# Patient Record
Sex: FEMALE | HISPANIC OR LATINO | Employment: FULL TIME | URBAN - METROPOLITAN AREA
[De-identification: names, ages, dates, MRNs, and addresses within clinical notes are randomized per-mention and may not be internally consistent; named-entity substitution may affect disease eponyms.]

---

## 2018-07-20 ENCOUNTER — HOSPITAL ENCOUNTER (EMERGENCY)
Facility: MEDICAL CENTER | Age: 33
End: 2018-07-20
Attending: EMERGENCY MEDICINE
Payer: MEDICAID

## 2018-07-20 VITALS
DIASTOLIC BLOOD PRESSURE: 58 MMHG | OXYGEN SATURATION: 100 % | RESPIRATION RATE: 14 BRPM | TEMPERATURE: 97.7 F | WEIGHT: 138.23 LBS | SYSTOLIC BLOOD PRESSURE: 112 MMHG | HEART RATE: 60 BPM | BODY MASS INDEX: 29.82 KG/M2 | HEIGHT: 57 IN

## 2018-07-20 DIAGNOSIS — R10.84 GENERALIZED ABDOMINAL PAIN: ICD-10-CM

## 2018-07-20 DIAGNOSIS — R11.2 NAUSEA AND VOMITING, INTRACTABILITY OF VOMITING NOT SPECIFIED, UNSPECIFIED VOMITING TYPE: ICD-10-CM

## 2018-07-20 LAB
ALBUMIN SERPL BCP-MCNC: 5.2 G/DL (ref 3.2–4.9)
ALBUMIN/GLOB SERPL: 1.4 G/DL
ALP SERPL-CCNC: 61 U/L (ref 30–99)
ALT SERPL-CCNC: 14 U/L (ref 2–50)
ANION GAP SERPL CALC-SCNC: 7 MMOL/L (ref 0–11.9)
APPEARANCE UR: CLEAR
AST SERPL-CCNC: 19 U/L (ref 12–45)
BASOPHILS # BLD AUTO: 0.5 % (ref 0–1.8)
BASOPHILS # BLD: 0.05 K/UL (ref 0–0.12)
BILIRUB SERPL-MCNC: 0.5 MG/DL (ref 0.1–1.5)
BUN SERPL-MCNC: 6 MG/DL (ref 8–22)
CALCIUM SERPL-MCNC: 10 MG/DL (ref 8.5–10.5)
CHLORIDE SERPL-SCNC: 105 MMOL/L (ref 96–112)
CO2 SERPL-SCNC: 27 MMOL/L (ref 20–33)
COLOR UR AUTO: YELLOW
CREAT SERPL-MCNC: 0.71 MG/DL (ref 0.5–1.4)
EOSINOPHIL # BLD AUTO: 0.3 K/UL (ref 0–0.51)
EOSINOPHIL NFR BLD: 2.8 % (ref 0–6.9)
ERYTHROCYTE [DISTWIDTH] IN BLOOD BY AUTOMATED COUNT: 41.2 FL (ref 35.9–50)
GLOBULIN SER CALC-MCNC: 3.6 G/DL (ref 1.9–3.5)
GLUCOSE SERPL-MCNC: 85 MG/DL (ref 65–99)
GLUCOSE UR QL STRIP.AUTO: NEGATIVE MG/DL
HCG UR QL: NEGATIVE
HCT VFR BLD AUTO: 46.4 % (ref 37–47)
HGB BLD-MCNC: 16.1 G/DL (ref 12–16)
IMM GRANULOCYTES # BLD AUTO: 0.02 K/UL (ref 0–0.11)
IMM GRANULOCYTES NFR BLD AUTO: 0.2 % (ref 0–0.9)
KETONES UR QL STRIP.AUTO: NEGATIVE MG/DL
LEUKOCYTE ESTERASE UR QL STRIP.AUTO: NEGATIVE
LIPASE SERPL-CCNC: 53 U/L (ref 11–82)
LYMPHOCYTES # BLD AUTO: 3.04 K/UL (ref 1–4.8)
LYMPHOCYTES NFR BLD: 27.9 % (ref 22–41)
MCH RBC QN AUTO: 30.3 PG (ref 27–33)
MCHC RBC AUTO-ENTMCNC: 34.7 G/DL (ref 33.6–35)
MCV RBC AUTO: 87.2 FL (ref 81.4–97.8)
MONOCYTES # BLD AUTO: 0.5 K/UL (ref 0–0.85)
MONOCYTES NFR BLD AUTO: 4.6 % (ref 0–13.4)
NEUTROPHILS # BLD AUTO: 6.98 K/UL (ref 2–7.15)
NEUTROPHILS NFR BLD: 64 % (ref 44–72)
NITRITE UR QL STRIP.AUTO: NEGATIVE
NRBC # BLD AUTO: 0 K/UL
NRBC BLD-RTO: 0 /100 WBC
PH UR STRIP.AUTO: 6.5 [PH]
PLATELET # BLD AUTO: 269 K/UL (ref 164–446)
PMV BLD AUTO: 10.4 FL (ref 9–12.9)
POTASSIUM SERPL-SCNC: 3.4 MMOL/L (ref 3.6–5.5)
PROT SERPL-MCNC: 8.8 G/DL (ref 6–8.2)
PROT UR QL STRIP: NEGATIVE MG/DL
RBC # BLD AUTO: 5.32 M/UL (ref 4.2–5.4)
RBC UR QL AUTO: NEGATIVE
SODIUM SERPL-SCNC: 139 MMOL/L (ref 135–145)
SP GR UR: <=1.005
WBC # BLD AUTO: 10.9 K/UL (ref 4.8–10.8)

## 2018-07-20 PROCEDURE — 700105 HCHG RX REV CODE 258: Performed by: EMERGENCY MEDICINE

## 2018-07-20 PROCEDURE — 96374 THER/PROPH/DIAG INJ IV PUSH: CPT

## 2018-07-20 PROCEDURE — 80053 COMPREHEN METABOLIC PANEL: CPT

## 2018-07-20 PROCEDURE — A9270 NON-COVERED ITEM OR SERVICE: HCPCS | Performed by: EMERGENCY MEDICINE

## 2018-07-20 PROCEDURE — 96361 HYDRATE IV INFUSION ADD-ON: CPT

## 2018-07-20 PROCEDURE — 85025 COMPLETE CBC W/AUTO DIFF WBC: CPT

## 2018-07-20 PROCEDURE — 99285 EMERGENCY DEPT VISIT HI MDM: CPT

## 2018-07-20 PROCEDURE — 36415 COLL VENOUS BLD VENIPUNCTURE: CPT

## 2018-07-20 PROCEDURE — 700102 HCHG RX REV CODE 250 W/ 637 OVERRIDE(OP): Performed by: EMERGENCY MEDICINE

## 2018-07-20 PROCEDURE — 81002 URINALYSIS NONAUTO W/O SCOPE: CPT

## 2018-07-20 PROCEDURE — 83690 ASSAY OF LIPASE: CPT

## 2018-07-20 PROCEDURE — 96375 TX/PRO/DX INJ NEW DRUG ADDON: CPT

## 2018-07-20 PROCEDURE — 700111 HCHG RX REV CODE 636 W/ 250 OVERRIDE (IP): Performed by: EMERGENCY MEDICINE

## 2018-07-20 PROCEDURE — 81025 URINE PREGNANCY TEST: CPT

## 2018-07-20 RX ORDER — DICYCLOMINE HCL 20 MG
20 TABLET ORAL 4 TIMES DAILY PRN
Qty: 20 TAB | Refills: 0 | Status: SHIPPED | OUTPATIENT
Start: 2018-07-20 | End: 2018-08-25

## 2018-07-20 RX ORDER — FAMOTIDINE 40 MG/1
40 TABLET, FILM COATED ORAL 2 TIMES DAILY
Qty: 14 TAB | Refills: 0 | Status: SHIPPED | OUTPATIENT
Start: 2018-07-20 | End: 2018-07-27

## 2018-07-20 RX ORDER — METOCLOPRAMIDE HYDROCHLORIDE 5 MG/ML
10 INJECTION INTRAMUSCULAR; INTRAVENOUS ONCE
Status: COMPLETED | OUTPATIENT
Start: 2018-07-20 | End: 2018-07-20

## 2018-07-20 RX ORDER — METOCLOPRAMIDE 10 MG/1
10 TABLET ORAL 4 TIMES DAILY PRN
Qty: 20 TAB | Refills: 0 | Status: SHIPPED | OUTPATIENT
Start: 2018-07-20 | End: 2018-08-25

## 2018-07-20 RX ORDER — SODIUM CHLORIDE 9 MG/ML
1000 INJECTION, SOLUTION INTRAVENOUS ONCE
Status: COMPLETED | OUTPATIENT
Start: 2018-07-20 | End: 2018-07-20

## 2018-07-20 RX ORDER — DIPHENHYDRAMINE HYDROCHLORIDE 50 MG/ML
12.5 INJECTION INTRAMUSCULAR; INTRAVENOUS ONCE
Status: COMPLETED | OUTPATIENT
Start: 2018-07-20 | End: 2018-07-20

## 2018-07-20 RX ADMIN — DIPHENHYDRAMINE HYDROCHLORIDE 12.5 MG: 50 INJECTION, SOLUTION INTRAMUSCULAR; INTRAVENOUS at 09:18

## 2018-07-20 RX ADMIN — LIDOCAINE HYDROCHLORIDE 30 ML: 20 SOLUTION OROPHARYNGEAL at 09:15

## 2018-07-20 RX ADMIN — METOCLOPRAMIDE 10 MG: 5 INJECTION, SOLUTION INTRAMUSCULAR; INTRAVENOUS at 09:19

## 2018-07-20 RX ADMIN — SODIUM CHLORIDE 1000 ML: 9 INJECTION, SOLUTION INTRAVENOUS at 09:19

## 2018-07-20 ASSESSMENT — ENCOUNTER SYMPTOMS
NAUSEA: 1
FEVER: 0
ABDOMINAL PAIN: 1
ROS GI COMMENTS: NEGATIVE HEMATEMESIS.
DIARRHEA: 1
BLOOD IN STOOL: 0
VOMITING: 1

## 2018-07-20 ASSESSMENT — PAIN SCALES - GENERAL: PAINLEVEL_OUTOF10: 8

## 2018-07-20 ASSESSMENT — LIFESTYLE VARIABLES: DO YOU DRINK ALCOHOL: NO

## 2018-07-20 NOTE — ED PROVIDER NOTES
ED Provider Note    Scribed for Elie Coppola M.D. by Brad Wu. 7/20/2018, 8:04 AM.    Primary care provider: Pcp Pt States None  Means of arrival: walk in  History obtained from: patient  History limited by: none    CHIEF COMPLAINT  Chief Complaint   Patient presents with   • Nausea/Vomiting/Diarrhea     Pt. states N/V/D for the past 3 days and this occurs every time she eats. Pt. states centralized abdominal pain. Pt. states the pain is sharp and stabbing in nature.       HPI  Christiane Jones is a 32 y.o. female who presents to the Emergency Department complaining of persistent and constant nausea, vomiting, and diarrhea for the last 3 days. She states that her nausea, vomiting is exacerbated with eating. Patient reports associated sharp abdominal pain. She reports a history of cholecystectomy. Patient denies fever, hematemesis, black stool, bloody stool, vaginal bleeding.    REVIEW OF SYSTEMS  Review of Systems   Constitutional: Negative for fever.   Gastrointestinal: Positive for abdominal pain, diarrhea, nausea and vomiting. Negative for blood in stool and melena.        Negative hematemesis.    Genitourinary:        Negative vaginal bleeding.    All other systems reviewed and are negative.  C.    PAST MEDICAL HISTORY   None noted    SURGICAL HISTORY   has a past surgical history that includes jorge by laparoscopy (9/14/2013).    SOCIAL HISTORY  Social History   Substance Use Topics   • Smoking status: Never Smoker   • Smokeless tobacco: Never Used   • Alcohol use No      Comment: special occations / on weekends       History   Drug Use   • Types: Inhaled     Comment: marijuana occ; last use 10/3/16       FAMILY HISTORY  Family History   Problem Relation Age of Onset   • Diabetes Father      pills   • Arthritis Mother    • Diabetes Mother      pills   • Arthritis Maternal Aunt    • Cancer Maternal Grandmother    • Diabetes Maternal Grandfather    • Diabetes Maternal Aunt        CURRENT  "MEDICATIONS  Current Outpatient Prescriptions:   •  hydrocodone-acetaminophen (NORCO) 5-325 MG Tab per tablet, Take 1-2 Tabs by mouth every 6 hours as needed., Disp: 20 Tab, Rfl: 0  •  ondansetron (ZOFRAN ODT) 4 MG TABLET DISPERSIBLE, Take 1 Tab by mouth every 8 hours as needed., Disp: 20 Tab, Rfl: 0    ALLERGIES  Allergies   Allergen Reactions   • Nkda [No Known Drug Allergy]        PHYSICAL EXAM  VITAL SIGNS: /58   Pulse 60   Temp 36.5 °C (97.7 °F)   Resp 14   Ht 1.448 m (4' 9\")   Wt 62.7 kg (138 lb 3.7 oz)   SpO2 100%   BMI 29.91 kg/m²     Constitutional:  Mild distress  HENT: Dry mucous membranes  Eyes:  No conjunctivitis or icterus  Neck: trachea is midline, no palpable thyroid  Lymphatic:  No cervical lymphadenopathy  Cardiovascular:  Regular rate and rhythm, no murmurs  Thorax & Lungs:  Normal breath sounds, no rhonchi  Abdomen:  Soft, left upper quadrant tenderness.   Skin:.  no rash  Back:  Non-tender, no CVA tenderness  Extremities:   no edema  Vascular:  symmetric radial pulse  Neurologic:  Normal gross motor    LABS  Labs Reviewed   CBC WITH DIFFERENTIAL - Abnormal; Notable for the following:        Result Value    WBC 10.9 (*)     Hemoglobin 16.1 (*)     All other components within normal limits   COMP METABOLIC PANEL - Abnormal; Notable for the following:     Potassium 3.4 (*)     Bun 6 (*)     Albumin 5.2 (*)     Total Protein 8.8 (*)     Globulin 3.6 (*)     All other components within normal limits   POC UA - Abnormal; Notable for the following:     POC Specific Gravity <=1.005 (*)     All other components within normal limits   LIPASE   ESTIMATED GFR   POC URINE PREGNANCY   All labs reviewed by me.    COURSE & MEDICAL DECISION MAKING  Pertinent Labs & Imaging studies reviewed. (See chart for details)    8:49 AM - Patient seen and examined at bedside. Patient will be treated with NS 1000 mL for clinical dehydration, Reglan 10 mg, GI coctail 30 ml, Benadryl 12.5 mg. Ordered CBC with " differential, CMP, Lipase, POC urinalysis, POC urine pregnancy, POC UA to evaluate her symptoms.    9:52 AM - Recheck: Patient re-evaluated at beside. Patient reports feeling greatly improved. Patient had a positive response to IV fluids. Patient's lab results discussed. Discussed patient's condition and treatment plan. Patient will be discharged with instructions and provided with strict return precautions. Advised to follow up with her primary. Instructed to return to Emergency Department immediately if any new or worsening symptoms.    The patient will return for new or worsening symptoms and is stable at the time of discharge.    The patient is referred to a primary physician for blood pressure management, diabetic screening, and for all other preventative health concerns.    DISPOSITION:  Patient will be discharged home in stable condition.    FOLLOW UP:  [address] [address] [phone]          OUTPATIENT MEDICATIONS:  Discharge Medication List as of 7/20/2018 10:05 AM      START taking these medications    Details   dicyclomine (BENTYL) 20 MG Tab Take 1 Tab by mouth 4 times a day as needed (abdominal pain)., Disp-20 Tab, R-0, Print Rx Paper      famotidine (PEPCID) 40 MG Tab Take 1 Tab by mouth 2 times a day for 7 days., Disp-14 Tab, R-0, Print Rx Paper      metoclopramide (REGLAN) 10 MG Tab Take 1 Tab by mouth 4 times a day as needed (prn nausea)., Disp-20 Tab, R-0, Print Rx Paper               FINAL IMPRESSION  1. Nausea and vomiting, intractability of vomiting not specified, unspecified vomiting type    2. Generalized abdominal pain          Brad IRENE (Alise), am scribing for, and in the presence of, Elie Coppola M.D..    Electronically signed by: Brad Wu (Alise), 7/20/2018    Elie IRENE M.D. personally performed the services described in this documentation, as scribed by Brad Wu in my presence, and it  is both accurate and complete.    The note accurately reflects work and decisions made by me.  Elie Coppola  7/20/2018  2:19 PM

## 2018-07-20 NOTE — ED TRIAGE NOTES
"Christiane Jones  32 y.o. female  Chief Complaint   Patient presents with   • Nausea/Vomiting/Diarrhea     Pt. states N/V/D for the past 3 days and this occurs every time she eats. Pt. states centralized abdominal pain. Pt. states the pain is sharp and stabbing in nature.     /58   Pulse 60   Temp 36.5 °C (97.7 °F)   Resp 14   Ht 1.448 m (4' 9\")   Wt 62.7 kg (138 lb 3.7 oz)   SpO2 100%   BMI 29.91 kg/m²     Pt amb to triage with steady gait for above complaint. Pt. Given emesis bag and blanket for comfort.  Pt is alert and oriented, speaking in full sentences, follows commands and responds appropriately to questions. NAD. Resp are even and unlabored.  Pt placed in lobby. Pt educated on triage process. Pt encouraged to alert staff for any changes.  "

## 2018-07-20 NOTE — ED NOTES
Pt given discharge instructions/prescriptions given/ home care instructions explained, pt verbalized understanding of instructions given, pt ambulatory to [facility] orantes.

## 2018-07-20 NOTE — DISCHARGE INSTRUCTIONS
Abdominal Pain, Women  Abdominal (stomach, pelvic, or belly) pain can be caused by many things. It is important to tell your doctor:  · The location of the pain.  · Does it come and go or is it present all the time?  · Are there things that start the pain (eating certain foods, exercise)?  · Are there other symptoms associated with the pain (fever, nausea, vomiting, diarrhea)?  All of this is helpful to know when trying to find the cause of the pain.  CAUSES   · Stomach: virus or bacteria infection, or ulcer.  · Intestine: appendicitis (inflamed appendix), regional ileitis (Crohn's disease), ulcerative colitis (inflamed colon), irritable bowel syndrome, diverticulitis (inflamed diverticulum of the colon), or cancer of the stomach or intestine.  · Gallbladder disease or stones in the gallbladder.  · Kidney disease, kidney stones, or infection.  · Pancreas infection or cancer.  · Fibromyalgia (pain disorder).  · Diseases of the female organs:  · Uterus: fibroid (non-cancerous) tumors or infection.  · Fallopian tubes: infection or tubal pregnancy.  · Ovary: cysts or tumors.  · Pelvic adhesions (scar tissue).  · Endometriosis (uterus lining tissue growing in the pelvis and on the pelvic organs).  · Pelvic congestion syndrome (female organs filling up with blood just before the menstrual period).  · Pain with the menstrual period.  · Pain with ovulation (producing an egg).  · Pain with an IUD (intrauterine device, birth control) in the uterus.  · Cancer of the female organs.  · Functional pain (pain not caused by a disease, may improve without treatment).  · Psychological pain.  · Depression.  DIAGNOSIS   Your doctor will decide the seriousness of your pain by doing an examination.  · Blood tests.  · X-rays.  · Ultrasound.  · CT scan (computed tomography, special type of X-ray).  · MRI (magnetic resonance imaging).  · Cultures, for infection.  · Barium enema (dye inserted in the large intestine, to better view it with  X-rays).  · Colonoscopy (looking in intestine with a lighted tube).  · Laparoscopy (minor surgery, looking in abdomen with a lighted tube).  · Major abdominal exploratory surgery (looking in abdomen with a large incision).  TREATMENT   The treatment will depend on the cause of the pain.   · Many cases can be observed and treated at home.  · Over-the-counter medicines recommended by your caregiver.  · Prescription medicine.  · Antibiotics, for infection.  · Birth control pills, for painful periods or for ovulation pain.  · Hormone treatment, for endometriosis.  · Nerve blocking injections.  · Physical therapy.  · Antidepressants.  · Counseling with a psychologist or psychiatrist.  · Minor or major surgery.  HOME CARE INSTRUCTIONS   · Do not take laxatives, unless directed by your caregiver.  · Take over-the-counter pain medicine only if ordered by your caregiver. Do not take aspirin because it can cause an upset stomach or bleeding.  · Try a clear liquid diet (broth or water) as ordered by your caregiver. Slowly move to a bland diet, as tolerated, if the pain is related to the stomach or intestine.  · Have a thermometer and take your temperature several times a day, and record it.  · Bed rest and sleep, if it helps the pain.  · Avoid sexual intercourse, if it causes pain.  · Avoid stressful situations.  · Keep your follow-up appointments and tests, as your caregiver orders.  · If the pain does not go away with medicine or surgery, you may try:  · Acupuncture.  · Relaxation exercises (yoga, meditation).  · Group therapy.  · Counseling.  SEEK MEDICAL CARE IF:   · You notice certain foods cause stomach pain.  · Your home care treatment is not helping your pain.  · You need stronger pain medicine.  · You want your IUD removed.  · You feel faint or lightheaded.  · You develop nausea and vomiting.  · You develop a rash.  · You are having side effects or an allergy to your medicine.  SEEK IMMEDIATE MEDICAL CARE IF:   · Your  pain does not go away or gets worse.  · You have a fever.  · Your pain is felt only in portions of the abdomen. The right side could possibly be appendicitis. The left lower portion of the abdomen could be colitis or diverticulitis.  · You are passing blood in your stools (bright red or black tarry stools, with or without vomiting).  · You have blood in your urine.  · You develop chills, with or without a fever.  · You pass out.  MAKE SURE YOU:   · Understand these instructions.  · Will watch your condition.  · Will get help right away if you are not doing well or get worse.  Document Released: 10/14/2008 Document Revised: 03/11/2013 Document Reviewed: 11/04/2010  [employer]® Patient Information ©2014 [employer]

## 2018-08-25 ENCOUNTER — APPOINTMENT (OUTPATIENT)
Dept: RADIOLOGY | Facility: MEDICAL CENTER | Age: 33
End: 2018-08-25
Attending: EMERGENCY MEDICINE
Payer: MEDICAID

## 2018-08-25 ENCOUNTER — HOSPITAL ENCOUNTER (EMERGENCY)
Facility: MEDICAL CENTER | Age: 33
End: 2018-08-26
Attending: EMERGENCY MEDICINE
Payer: MEDICAID

## 2018-08-25 DIAGNOSIS — R10.31 RIGHT LOWER QUADRANT ABDOMINAL PAIN: ICD-10-CM

## 2018-08-25 DIAGNOSIS — N83.209 RUPTURED OVARIAN CYST: ICD-10-CM

## 2018-08-25 LAB
ALBUMIN SERPL BCP-MCNC: 4.6 G/DL (ref 3.2–4.9)
ALBUMIN/GLOB SERPL: 1.4 G/DL
ALP SERPL-CCNC: 54 U/L (ref 30–99)
ALT SERPL-CCNC: 13 U/L (ref 2–50)
ANION GAP SERPL CALC-SCNC: 11 MMOL/L (ref 0–11.9)
APPEARANCE UR: ABNORMAL
AST SERPL-CCNC: 17 U/L (ref 12–45)
BACTERIA #/AREA URNS HPF: ABNORMAL /HPF
BASOPHILS # BLD AUTO: 0.2 % (ref 0–1.8)
BASOPHILS # BLD: 0.03 K/UL (ref 0–0.12)
BILIRUB SERPL-MCNC: 0.5 MG/DL (ref 0.1–1.5)
BILIRUB UR QL STRIP.AUTO: NEGATIVE
BUN SERPL-MCNC: 10 MG/DL (ref 8–22)
CALCIUM SERPL-MCNC: 9.7 MG/DL (ref 8.5–10.5)
CHLORIDE SERPL-SCNC: 103 MMOL/L (ref 96–112)
CO2 SERPL-SCNC: 23 MMOL/L (ref 20–33)
COLOR UR: YELLOW
CREAT SERPL-MCNC: 0.66 MG/DL (ref 0.5–1.4)
EOSINOPHIL # BLD AUTO: 0.18 K/UL (ref 0–0.51)
EOSINOPHIL NFR BLD: 1.2 % (ref 0–6.9)
EPI CELLS #/AREA URNS HPF: ABNORMAL /HPF
ERYTHROCYTE [DISTWIDTH] IN BLOOD BY AUTOMATED COUNT: 39.8 FL (ref 35.9–50)
GLOBULIN SER CALC-MCNC: 3.3 G/DL (ref 1.9–3.5)
GLUCOSE SERPL-MCNC: 96 MG/DL (ref 65–99)
GLUCOSE UR STRIP.AUTO-MCNC: NEGATIVE MG/DL
HCG UR QL: NEGATIVE
HCT VFR BLD AUTO: 42.1 % (ref 37–47)
HGB BLD-MCNC: 14.9 G/DL (ref 12–16)
HYALINE CASTS #/AREA URNS LPF: ABNORMAL /LPF
IMM GRANULOCYTES # BLD AUTO: 0.08 K/UL (ref 0–0.11)
IMM GRANULOCYTES NFR BLD AUTO: 0.5 % (ref 0–0.9)
KETONES UR STRIP.AUTO-MCNC: NEGATIVE MG/DL
LEUKOCYTE ESTERASE UR QL STRIP.AUTO: ABNORMAL
LYMPHOCYTES # BLD AUTO: 2.39 K/UL (ref 1–4.8)
LYMPHOCYTES NFR BLD: 15.8 % (ref 22–41)
MCH RBC QN AUTO: 30.6 PG (ref 27–33)
MCHC RBC AUTO-ENTMCNC: 35.4 G/DL (ref 33.6–35)
MCV RBC AUTO: 86.4 FL (ref 81.4–97.8)
MICRO URNS: ABNORMAL
MONOCYTES # BLD AUTO: 0.73 K/UL (ref 0–0.85)
MONOCYTES NFR BLD AUTO: 4.8 % (ref 0–13.4)
MUCOUS THREADS #/AREA URNS HPF: ABNORMAL /HPF
NEUTROPHILS # BLD AUTO: 11.7 K/UL (ref 2–7.15)
NEUTROPHILS NFR BLD: 77.5 % (ref 44–72)
NITRITE UR QL STRIP.AUTO: NEGATIVE
NRBC # BLD AUTO: 0 K/UL
NRBC BLD-RTO: 0 /100 WBC
PH UR STRIP.AUTO: 5 [PH]
PLATELET # BLD AUTO: 249 K/UL (ref 164–446)
PMV BLD AUTO: 10.3 FL (ref 9–12.9)
POTASSIUM SERPL-SCNC: 3.6 MMOL/L (ref 3.6–5.5)
PROT SERPL-MCNC: 7.9 G/DL (ref 6–8.2)
PROT UR QL STRIP: NEGATIVE MG/DL
RBC # BLD AUTO: 4.87 M/UL (ref 4.2–5.4)
RBC # URNS HPF: ABNORMAL /HPF
RBC UR QL AUTO: NEGATIVE
SODIUM SERPL-SCNC: 137 MMOL/L (ref 135–145)
SP GR UR REFRACTOMETRY: 1.03
SP GR UR STRIP.AUTO: 1.03
UROBILINOGEN UR STRIP.AUTO-MCNC: 0.2 MG/DL
WBC # BLD AUTO: 15.1 K/UL (ref 4.8–10.8)
WBC #/AREA URNS HPF: ABNORMAL /HPF

## 2018-08-25 PROCEDURE — 700102 HCHG RX REV CODE 250 W/ 637 OVERRIDE(OP): Performed by: EMERGENCY MEDICINE

## 2018-08-25 PROCEDURE — A9270 NON-COVERED ITEM OR SERVICE: HCPCS | Performed by: EMERGENCY MEDICINE

## 2018-08-25 PROCEDURE — 81001 URINALYSIS AUTO W/SCOPE: CPT

## 2018-08-25 PROCEDURE — 74177 CT ABD & PELVIS W/CONTRAST: CPT

## 2018-08-25 PROCEDURE — 87086 URINE CULTURE/COLONY COUNT: CPT

## 2018-08-25 PROCEDURE — 96374 THER/PROPH/DIAG INJ IV PUSH: CPT

## 2018-08-25 PROCEDURE — 76830 TRANSVAGINAL US NON-OB: CPT

## 2018-08-25 PROCEDURE — 99285 EMERGENCY DEPT VISIT HI MDM: CPT

## 2018-08-25 PROCEDURE — 81025 URINE PREGNANCY TEST: CPT

## 2018-08-25 PROCEDURE — 87491 CHLMYD TRACH DNA AMP PROBE: CPT

## 2018-08-25 PROCEDURE — 85025 COMPLETE CBC W/AUTO DIFF WBC: CPT

## 2018-08-25 PROCEDURE — 80053 COMPREHEN METABOLIC PANEL: CPT

## 2018-08-25 PROCEDURE — 700117 HCHG RX CONTRAST REV CODE 255: Performed by: EMERGENCY MEDICINE

## 2018-08-25 PROCEDURE — 96375 TX/PRO/DX INJ NEW DRUG ADDON: CPT

## 2018-08-25 PROCEDURE — 87591 N.GONORRHOEAE DNA AMP PROB: CPT

## 2018-08-25 PROCEDURE — 700111 HCHG RX REV CODE 636 W/ 250 OVERRIDE (IP): Performed by: EMERGENCY MEDICINE

## 2018-08-25 RX ORDER — ONDANSETRON 2 MG/ML
4 INJECTION INTRAMUSCULAR; INTRAVENOUS ONCE
Status: COMPLETED | OUTPATIENT
Start: 2018-08-25 | End: 2018-08-25

## 2018-08-25 RX ORDER — MORPHINE SULFATE 4 MG/ML
4 INJECTION, SOLUTION INTRAMUSCULAR; INTRAVENOUS ONCE
Status: COMPLETED | OUTPATIENT
Start: 2018-08-25 | End: 2018-08-25

## 2018-08-25 RX ADMIN — MORPHINE SULFATE 4 MG: 4 INJECTION INTRAVENOUS at 21:59

## 2018-08-25 RX ADMIN — IOHEXOL 100 ML: 350 INJECTION, SOLUTION INTRAVENOUS at 23:49

## 2018-08-25 RX ADMIN — ONDANSETRON 4 MG: 2 INJECTION INTRAMUSCULAR; INTRAVENOUS at 21:58

## 2018-08-25 RX ADMIN — LIDOCAINE HYDROCHLORIDE 15 ML: 20 SOLUTION OROPHARYNGEAL at 23:15

## 2018-08-25 ASSESSMENT — ENCOUNTER SYMPTOMS
DIARRHEA: 1
ABDOMINAL PAIN: 1
NAUSEA: 1
VOMITING: 0

## 2018-08-25 ASSESSMENT — PAIN SCALES - WONG BAKER: WONGBAKER_NUMERICALRESPONSE: HURTS A WHOLE LOT

## 2018-08-25 ASSESSMENT — PAIN SCALES - GENERAL
PAINLEVEL_OUTOF10: 9
PAINLEVEL_OUTOF10: 9
PAINLEVEL_OUTOF10: 8

## 2018-08-26 VITALS
WEIGHT: 134.04 LBS | DIASTOLIC BLOOD PRESSURE: 50 MMHG | BODY MASS INDEX: 28.92 KG/M2 | HEIGHT: 57 IN | OXYGEN SATURATION: 96 % | RESPIRATION RATE: 16 BRPM | TEMPERATURE: 98.3 F | HEART RATE: 70 BPM | SYSTOLIC BLOOD PRESSURE: 90 MMHG

## 2018-08-26 LAB
C TRACH DNA SPEC QL NAA+PROBE: NEGATIVE
N GONORRHOEA DNA SPEC QL NAA+PROBE: NEGATIVE
SPECIMEN SOURCE: NORMAL

## 2018-08-26 RX ORDER — KETOROLAC TROMETHAMINE 10 MG/1
10 TABLET, FILM COATED ORAL EVERY 4 HOURS PRN
Qty: 30 TAB | Refills: 0 | Status: SHIPPED | OUTPATIENT
Start: 2018-08-26 | End: 2018-09-05

## 2018-08-26 ASSESSMENT — PAIN SCALES - GENERAL: PAINLEVEL_OUTOF10: 3

## 2018-08-26 NOTE — DISCHARGE INSTRUCTIONS
Your CT and ultrasound revealed a ruptured ovarian cyst, this is likely the cause of your pain.  Your ultrasound should be followed up with a gynecologist or your primary care physician in the next 6 weeks.    Ovarian Cyst  An ovarian cyst is a fluid-filled sac on an ovary. The ovaries are organs that make eggs in women. Most ovarian cysts go away on their own and are not cancerous (are benign). Some cysts need treatment.  Follow these instructions at home:  · Take over-the-counter and prescription medicines only as told by your doctor.  · Do not drive or use heavy machinery while taking prescription pain medicine.  · Get pelvic exams and Pap tests as often as told by your doctor.  · Return to your normal activities as told by your doctor. Ask your doctor what activities are safe for you.  · Do not use any products that contain nicotine or tobacco, such as cigarettes and e-cigarettes. If you need help quitting, ask your doctor.  · Keep all follow-up visits as told by your doctor. This is important.  Contact a doctor if:  · Your periods are:  ¨ Late.  ¨ Irregular.  ¨ Painful.  · Your periods stop.  · You have pelvic pain that does not go away.  · You have pressure on your bladder.  · You have trouble making your bladder empty when you pee (urinate).  · You have pain during sex.  · You have any of the following in your belly (abdomen):  ¨ A feeling of fullness.  ¨ Pressure.  ¨ Discomfort.  ¨ Pain that does not go away.  ¨ Swelling.  · You feel sick most of the time.  · You have trouble pooping (have constipation).  · You are not as hungry as usual (you lose your appetite).  · You get very bad acne.  · You start to have more hair on your body and face.  · You are gaining weight or losing weight without changing your exercise and eating habits.  · You think you may be pregnant.  Get help right away if:  · You have belly pain that is very bad or gets worse.  · You cannot eat or drink without throwing up  (vomiting).  · You suddenly get a fever.  · Your period is a lot heavier than usual.  This information is not intended to replace advice given to you by your health care provider. Make sure you discuss any questions you have with your health care provider.  Document Released: 06/05/2009 Document Revised: 07/07/2017 Document Reviewed: 05/21/2017  [employer] Patient Education © 2017 [employer]

## 2018-08-26 NOTE — ED PROVIDER NOTES
"ED Provider Note    Scribed for Guillermo Araujo M.D. by Tacos Greene. 8/25/2018  9:08 PM    Means of arrival: Walk in  History obtained by: Patient  Limitations: None      CHIEF COMPLAINT  Chief Complaint   Patient presents with   • RLQ Pain   • Nausea/Vomiting/Diarrhea       HPI  Christiane Jones is a 32 y.o. female who presents to the ED complaining of abdominal pain which initially started approximately 1 week ago. Patient states the pain feels like menstrual cramps. She also experiences intermittent sharp pain \"like contractions.\" The pain was initially more diffuse to her abdomen but became more prominent to her right lower quadrant starting today. She has a history of cholecystectomy and states her current pain feels similar to prior gallbladder pain. She still has her appendix. Patient notes having diarrhea in which she has to go to the bathroom within a couple of minutes after eating. She also reports nausea. She has been unable to eating anything today secondary to the nausea and diarrhea. Patient denies any vomiting. No vaginal symptoms. She was tested for STD 4 days ago but has not received a call with results yet.  She denies any vaginal discharge dysuria or hematuria      REVIEW OF SYSTEMS  Review of Systems   Gastrointestinal: Positive for abdominal pain (prominent to RLQ), diarrhea and nausea. Negative for vomiting.   Genitourinary:        Negative vaginal symptoms   All other systems reviewed and are negative.  See HPI for further details.   C    PAST MEDICAL HISTORY   None noted    SOCIAL HISTORY  Social History     Social History Main Topics   • Smoking status: Never Smoker   • Smokeless tobacco: Never Used   • Alcohol use Yes      Comment: special occations / on weekends    • Drug use: Yes     Types: Inhaled      Comment: marijuana occ   • Sexual activity: Yes     Partners: Male     Birth control/ protection: Condom       SURGICAL HISTORY   has a past surgical history that includes jorge by " "laparoscopy (9/14/2013).    CURRENT MEDICATIONS  Home Medications     Reviewed by No Boss R.N. (Registered Nurse) on 08/25/18 at 2104  Med List Status: Complete   Medication Last Dose Status        Patient Rickie Taking any Medications                       ALLERGIES  Allergies   Allergen Reactions   • Nkda [No Known Drug Allergy]        PHYSICAL EXAM  BP (!) 90/50   Pulse 60   Temp 36.8 °C (98.3 °F)   Resp 16   Ht 1.448 m (4' 9\")   Wt 60.8 kg (134 lb 0.6 oz)   LMP 08/08/2018   SpO2 99%   Breastfeeding? No   BMI 29.01 kg/m²   Constitutional: Well developed, Well nourished, No acute distress, Non-toxic appearance.   HENT: Normocephalic, Atraumatic,  Eyes: PERRL, EOM intact  Neck: Supple, no meningismus  Lymphatic: No lymphadenopathy noted.   Cardiovascular: Regular rate and rhythm  Lungs: Clear to auscultation bilaterally, easy unlabored respirations   Abdomen: Bowel sounds normal, Soft. Focal tenderness at McBurney's point.  Skin: Warm, Dry, no rash  Back: No tenderness, No CVA tenderness.   Extremities: No edema to lower extremities  Neurologic: Alert and oriented, appropriate, follows commands, moving all extremities, normal speech   Psychiatric: Affect normal      DIAGNOSTIC STUDIES / PROCEDURES    LABS  Results for orders placed or performed during the hospital encounter of 08/25/18   BETA-HCG QUALITATIVE URINE   Result Value Ref Range    Beta-Hcg Urine Negative Negative   URINALYSIS,CULTURE IF INDICATED   Result Value Ref Range    Color Yellow     Character Cloudy (A)     Specific Gravity 1.026 <1.035    Ph 5.0 5.0 - 8.0    Glucose Negative Negative mg/dL    Ketones Negative Negative mg/dL    Protein Negative Negative mg/dL    Bilirubin Negative Negative    Urobilinogen, Urine 0.2 Negative    Nitrite Negative Negative    Leukocyte Esterase Small (A) Negative    Occult Blood Negative Negative    Micro Urine Req Microscopic    REFRACTOMETER SG   Result Value Ref Range    Specific Gravity 1.026 "    CBC WITH DIFFERENTIAL   Result Value Ref Range    WBC 15.1 (H) 4.8 - 10.8 K/uL    RBC 4.87 4.20 - 5.40 M/uL    Hemoglobin 14.9 12.0 - 16.0 g/dL    Hematocrit 42.1 37.0 - 47.0 %    MCV 86.4 81.4 - 97.8 fL    MCH 30.6 27.0 - 33.0 pg    MCHC 35.4 (H) 33.6 - 35.0 g/dL    RDW 39.8 35.9 - 50.0 fL    Platelet Count 249 164 - 446 K/uL    MPV 10.3 9.0 - 12.9 fL    Neutrophils-Polys 77.50 (H) 44.00 - 72.00 %    Lymphocytes 15.80 (L) 22.00 - 41.00 %    Monocytes 4.80 0.00 - 13.40 %    Eosinophils 1.20 0.00 - 6.90 %    Basophils 0.20 0.00 - 1.80 %    Immature Granulocytes 0.50 0.00 - 0.90 %    Nucleated RBC 0.00 /100 WBC    Neutrophils (Absolute) 11.70 (H) 2.00 - 7.15 K/uL    Lymphs (Absolute) 2.39 1.00 - 4.80 K/uL    Monos (Absolute) 0.73 0.00 - 0.85 K/uL    Eos (Absolute) 0.18 0.00 - 0.51 K/uL    Baso (Absolute) 0.03 0.00 - 0.12 K/uL    Immature Granulocytes (abs) 0.08 0.00 - 0.11 K/uL    NRBC (Absolute) 0.00 K/uL   CMP   Result Value Ref Range    Sodium 137 135 - 145 mmol/L    Potassium 3.6 3.6 - 5.5 mmol/L    Chloride 103 96 - 112 mmol/L    Co2 23 20 - 33 mmol/L    Anion Gap 11.0 0.0 - 11.9    Glucose 96 65 - 99 mg/dL    Bun 10 8 - 22 mg/dL    Creatinine 0.66 0.50 - 1.40 mg/dL    Calcium 9.7 8.5 - 10.5 mg/dL    AST(SGOT) 17 12 - 45 U/L    ALT(SGPT) 13 2 - 50 U/L    Alkaline Phosphatase 54 30 - 99 U/L    Total Bilirubin 0.5 0.1 - 1.5 mg/dL    Albumin 4.6 3.2 - 4.9 g/dL    Total Protein 7.9 6.0 - 8.2 g/dL    Globulin 3.3 1.9 - 3.5 g/dL    A-G Ratio 1.4 g/dL   URINE MICROSCOPIC (W/UA)   Result Value Ref Range    WBC 2-5 /hpf    RBC 0-2 /hpf    Bacteria Moderate (A) None /hpf    Epithelial Cells Few /hpf    Hyaline Cast 6-10 (A) /lpf    Mucous Threads Moderate /hpf   ESTIMATED GFR   Result Value Ref Range    GFR If African American >60 >60 mL/min/1.73 m 2    GFR If Non African American >60 >60 mL/min/1.73 m 2         RADIOLOGY  US-GYN-PELVIS TRANSVAGINAL    (Results Pending)     CT-ABDOMEN-PELVIS WITH   Final Result          1.  No acute abnormality.   2.  Peripherally enhancing right ovarian lesion, appearance most typical of involuting cyst. Could be followed up with pelvic sonography for further characterization.   3.  Hepatomegaly   4.  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  Nabothian cyst.   2.  Thickened endometrial stripe, likely proliferative given patient age, consider endometrial pathology. Could be followed up in 6 weeks with repeat pelvic sonogram.   3.  Lesion in the right ovary with irregular contour, appearance suggests involuting cyst. Otherwise indeterminate. Could be followed up with six-week repeat pelvic sonogram.              COURSE & MEDICAL DECISION MAKING  Pertinent Labs & Imaging studies reviewed. (See chart for details)    Summary of CARE  Patient here with symptoms consistent with the hemorrhagic cyst versus appendicitis versus ovarian torsion.  Patient will need evaluation with labs and imaging to rule these etiologies out.  Given the colicky nature of patient's pain I am leaning towards an ovarian etiology.  The ultrasound failed to reveal any signs of torsion but does show a questionable involuting cyst.  CT checked as this may be a incidental finding and given patient's focal tenderness at McBurney's point.  Patient CT fails to reveal signs of appendicitis and really demonstrates the involuting cyst.  This is likely the cause of etiology of patient's pain.  I discussed ultrasound and CT findings with patient and she understands to follow-up for repeat ultrasound and further evaluation with a gynecologist.    9:08 PM Patient seen and examined at bedside. The patient presents with right lower quadrant pain. Discussed plan of care which includes imaging and labs. Patient understands and agrees to plan. Ordered for US gyn pelvis transvaginal, CBC, CMP, POC urinalysis, POC urine pregnancy, chlamydia/GC PCR urine or swab, urinalysis,  beta-HCG qualitative urine, urine microscopic, refractometer SG, urine culture to evaluate.    Following analgesics patient reports pain is considerably improved    Patient updated on results    FINAL IMPRESSION  1.  Abdominal pain, ovarian cyst      Tacos IRENE (Scribe), am scribing for, and in the presence of, Guillermo Araujo M.D..    Electronically signed by: Tacos Greene (Scribe), 8/25/2018    IGuillermo M.D. personally performed the services described in this documentation, as scribed by Tacos Gerene in my presence, and it is both accurate and complete.    The note accurately reflects work and decisions made by me.  Guillermo Arajuo  8/26/2018  2:02 AM

## 2018-08-26 NOTE — ED NOTES
Pt in bed,  at bedside. Updated on plan of care. Verbalizes understanding. Denies any further needs or concerns at this time.

## 2018-08-26 NOTE — ED NOTES
Pt provided with discharge instructions and education. Verbalizes understanding. Denies any questions or concerns at this time. Ambulates independently to lobby.

## 2018-08-26 NOTE — ED TRIAGE NOTES
Pt states she has been having cramping abdominal pain since Saturday, pt states she was tested for std and hpv at urgent care. Pt states the pain is mainly RLQ with diarrhea, pt still has her appendix, pt has gallbladder removed. Pt states last menstrual cycle was 8/8/18-8/12-18, pt does feel nauseated, pt denies fevers but has been having night sweats.

## 2018-08-26 NOTE — ED NOTES
Pt has been having increasing abdominal pain for the past week. Describes the pain as contraction-like, starting in lower right abdomen and radiating through lower back, worse when she stands straight or has intercourse. Was seen at urgent care a few days ago for the same thing, no confirmed diagnosis noted. Pt experiences nausea and vomiting in conjunction with pain, having difficulty holding any food down at this time. Pt denies any further needs or concerns at this time.

## 2018-08-26 NOTE — ED NOTES
Updated on plan of care. Verbalizes understanding. Denies any further needs or concerns at this time.

## 2018-08-27 LAB
BACTERIA UR CULT: NORMAL
SIGNIFICANT IND 70042: NORMAL
SITE SITE: NORMAL
SOURCE SOURCE: NORMAL

## 2019-05-12 ENCOUNTER — HOSPITAL ENCOUNTER (EMERGENCY)
Facility: MEDICAL CENTER | Age: 34
End: 2019-05-13
Attending: EMERGENCY MEDICINE

## 2019-05-12 DIAGNOSIS — F41.9 ANXIETY: ICD-10-CM

## 2019-05-12 DIAGNOSIS — R07.9 CHEST PAIN, UNSPECIFIED TYPE: ICD-10-CM

## 2019-05-12 PROCEDURE — 99283 EMERGENCY DEPT VISIT LOW MDM: CPT

## 2019-05-13 ENCOUNTER — APPOINTMENT (OUTPATIENT)
Dept: RADIOLOGY | Facility: MEDICAL CENTER | Age: 34
End: 2019-05-13
Attending: EMERGENCY MEDICINE

## 2019-05-13 VITALS
OXYGEN SATURATION: 98 % | RESPIRATION RATE: 17 BRPM | DIASTOLIC BLOOD PRESSURE: 62 MMHG | TEMPERATURE: 97 F | HEART RATE: 58 BPM | SYSTOLIC BLOOD PRESSURE: 107 MMHG | BODY MASS INDEX: 27.67 KG/M2 | WEIGHT: 131.84 LBS | HEIGHT: 58 IN

## 2019-05-13 LAB
ALBUMIN SERPL BCP-MCNC: 4.3 G/DL (ref 3.2–4.9)
ALBUMIN/GLOB SERPL: 1.4 G/DL
ALP SERPL-CCNC: 57 U/L (ref 30–99)
ALT SERPL-CCNC: 10 U/L (ref 2–50)
ANION GAP SERPL CALC-SCNC: 7 MMOL/L (ref 0–11.9)
AST SERPL-CCNC: 16 U/L (ref 12–45)
BASOPHILS # BLD AUTO: 0.4 % (ref 0–1.8)
BASOPHILS # BLD: 0.04 K/UL (ref 0–0.12)
BILIRUB SERPL-MCNC: 0.5 MG/DL (ref 0.1–1.5)
BUN SERPL-MCNC: 10 MG/DL (ref 8–22)
CALCIUM SERPL-MCNC: 9.4 MG/DL (ref 8.5–10.5)
CHLORIDE SERPL-SCNC: 107 MMOL/L (ref 96–112)
CO2 SERPL-SCNC: 26 MMOL/L (ref 20–33)
CREAT SERPL-MCNC: 0.83 MG/DL (ref 0.5–1.4)
EKG IMPRESSION: NORMAL
EOSINOPHIL # BLD AUTO: 0.29 K/UL (ref 0–0.51)
EOSINOPHIL NFR BLD: 2.7 % (ref 0–6.9)
ERYTHROCYTE [DISTWIDTH] IN BLOOD BY AUTOMATED COUNT: 42 FL (ref 35.9–50)
GLOBULIN SER CALC-MCNC: 3 G/DL (ref 1.9–3.5)
GLUCOSE SERPL-MCNC: 93 MG/DL (ref 65–99)
HCT VFR BLD AUTO: 42.9 % (ref 37–47)
HGB BLD-MCNC: 14.4 G/DL (ref 12–16)
IMM GRANULOCYTES # BLD AUTO: 0.03 K/UL (ref 0–0.11)
IMM GRANULOCYTES NFR BLD AUTO: 0.3 % (ref 0–0.9)
LYMPHOCYTES # BLD AUTO: 3.58 K/UL (ref 1–4.8)
LYMPHOCYTES NFR BLD: 33 % (ref 22–41)
MCH RBC QN AUTO: 29.8 PG (ref 27–33)
MCHC RBC AUTO-ENTMCNC: 33.6 G/DL (ref 33.6–35)
MCV RBC AUTO: 88.6 FL (ref 81.4–97.8)
MONOCYTES # BLD AUTO: 0.7 K/UL (ref 0–0.85)
MONOCYTES NFR BLD AUTO: 6.4 % (ref 0–13.4)
NEUTROPHILS # BLD AUTO: 6.22 K/UL (ref 2–7.15)
NEUTROPHILS NFR BLD: 57.2 % (ref 44–72)
NRBC # BLD AUTO: 0 K/UL
NRBC BLD-RTO: 0 /100 WBC
PLATELET # BLD AUTO: 246 K/UL (ref 164–446)
PMV BLD AUTO: 10.6 FL (ref 9–12.9)
POTASSIUM SERPL-SCNC: 3.3 MMOL/L (ref 3.6–5.5)
PROT SERPL-MCNC: 7.3 G/DL (ref 6–8.2)
RBC # BLD AUTO: 4.84 M/UL (ref 4.2–5.4)
SODIUM SERPL-SCNC: 140 MMOL/L (ref 135–145)
TROPONIN I SERPL-MCNC: 0.01 NG/ML (ref 0–0.04)
WBC # BLD AUTO: 10.9 K/UL (ref 4.8–10.8)

## 2019-05-13 PROCEDURE — 93005 ELECTROCARDIOGRAM TRACING: CPT | Performed by: EMERGENCY MEDICINE

## 2019-05-13 PROCEDURE — 80053 COMPREHEN METABOLIC PANEL: CPT

## 2019-05-13 PROCEDURE — 85025 COMPLETE CBC W/AUTO DIFF WBC: CPT

## 2019-05-13 PROCEDURE — 84484 ASSAY OF TROPONIN QUANT: CPT

## 2019-05-13 PROCEDURE — 71046 X-RAY EXAM CHEST 2 VIEWS: CPT

## 2019-05-13 ASSESSMENT — LIFESTYLE VARIABLES: DO YOU DRINK ALCOHOL: NO

## 2019-05-13 NOTE — ED NOTES
Patient ambulated with steady gait to room.  at bedside, hooked up to monitor, given warm blankets.

## 2019-05-13 NOTE — ED PROVIDER NOTES
ED Provider Note    ER PROVIDER NOTE    CHIEF COMPLAINT  Chief Complaint   Patient presents with   • Panic Attack     starting within last 30 minutes, started while laying in bed with  watching movie       HPI  Christiane Jones is a 33 y.o. female who presents to the emergency department complaining of anxiety and chest pain.  Patient reports that earlier tonight she began to experience some chest tightness while at rest.  She developed tingling to her hands and face as well as difficulty breathing shortly after the chest tightness started.  She states she has had a few similar episodes throughout the week, primarily when at rest.  Does not really describe chest pain or exertional symptoms.  She denies any leg pain or swelling.  No fevers chills or cough.  No nausea vomiting or diaphoresis.  States she does have long-standing issues with anxiety but is usually able to control with marijuana    Has no history of diabetes, hypertension, cigarette smoking dyslipidemia, family history of early CAD, history of CAD.      REVIEW OF SYSTEMS  Pertinent positives include chest pain. Pertinent negatives include no syncope. See HPI for details. All other systems reviewed and are negative.    PAST MEDICAL HISTORY   has a past medical history of Anxiety and Panic attack.    SURGICAL HISTORY   has a past surgical history that includes jorge by laparoscopy (9/14/2013).    FAMILY HISTORY  Family History   Problem Relation Age of Onset   • Diabetes Father         pills   • Arthritis Mother    • Diabetes Mother         pills   • Arthritis Maternal Aunt    • Cancer Maternal Grandmother    • Diabetes Maternal Grandfather    • Diabetes Maternal Aunt        SOCIAL HISTORY  Social History     Social History   • Marital status:      Spouse name: N/A   • Number of children: N/A   • Years of education: N/A     Social History Main Topics   • Smoking status: Never Smoker   • Smokeless tobacco: Never Used   • Alcohol use Yes       "Comment: occasional   • Drug use: Yes     Types: Inhaled      Comment: occasional marijuana   • Sexual activity: Yes     Partners: Male     Birth control/ protection: Condom     Other Topics Concern   • Not on file     Social History Narrative   • No narrative on file      History   Drug Use   • Types: Inhaled     Comment: occasional marijuana       CURRENT MEDICATIONS  Home Medications     Reviewed by Annalisa Bateman R.N. (Registered Nurse) on 05/12/19 at 2309  Med List Status: Complete   Medication Last Dose Status        Patient Rickie Taking any Medications                       ALLERGIES  No Known Allergies    PHYSICAL EXAM  VITAL SIGNS: /62   Pulse (!) 58   Temp 36.1 °C (97 °F) (Temporal)   Resp 17   Ht 1.473 m (4' 10\")   Wt 59.8 kg (131 lb 13.4 oz)   SpO2 98%   BMI 27.55 kg/m²   Pulse ox interpretation: I interpret this pulse ox as normal.    Constitutional: Alert in no apparent distress.  HENT: No signs of trauma, Bilateral external ears normal, Nose normal.   Eyes: Pupils are equal and reactive, Conjunctiva normal, Non-icteric.   Neck: Normal range of motion, No tenderness, Supple, No stridor.   Lymphatic: No lymphadenopathy noted.   Cardiovascular: Regular rate and rhythm, no murmurs.   Thorax & Lungs: Normal breath sounds, No respiratory distress, No wheezing, No chest tenderness.   Abdomen: Bowel sounds normal, Soft, No tenderness, No masses, No pulsatile masses. No peritoneal signs.  Skin: Warm, Dry, No erythema, No rash.   Back: No bony tenderness, No CVA tenderness.   Extremities: Intact distal pulses, No edema, No tenderness, No cyanosis, Negative Ilir's sign.  Musculoskeletal: Good range of motion in all major joints. No tenderness to palpation or major deformities noted.   Neurologic: Alert , Normal motor function, Normal sensory function, No focal deficits noted.   Psychiatric: Affect normal, Judgment normal, Mood normal.     DIAGNOSTIC STUDIES / PROCEDURES    Results for orders " placed or performed during the hospital encounter of 05/12/19   CBC WITH DIFFERENTIAL   Result Value Ref Range    WBC 10.9 (H) 4.8 - 10.8 K/uL    RBC 4.84 4.20 - 5.40 M/uL    Hemoglobin 14.4 12.0 - 16.0 g/dL    Hematocrit 42.9 37.0 - 47.0 %    MCV 88.6 81.4 - 97.8 fL    MCH 29.8 27.0 - 33.0 pg    MCHC 33.6 33.6 - 35.0 g/dL    RDW 42.0 35.9 - 50.0 fL    Platelet Count 246 164 - 446 K/uL    MPV 10.6 9.0 - 12.9 fL    Neutrophils-Polys 57.20 44.00 - 72.00 %    Lymphocytes 33.00 22.00 - 41.00 %    Monocytes 6.40 0.00 - 13.40 %    Eosinophils 2.70 0.00 - 6.90 %    Basophils 0.40 0.00 - 1.80 %    Immature Granulocytes 0.30 0.00 - 0.90 %    Nucleated RBC 0.00 /100 WBC    Neutrophils (Absolute) 6.22 2.00 - 7.15 K/uL    Lymphs (Absolute) 3.58 1.00 - 4.80 K/uL    Monos (Absolute) 0.70 0.00 - 0.85 K/uL    Eos (Absolute) 0.29 0.00 - 0.51 K/uL    Baso (Absolute) 0.04 0.00 - 0.12 K/uL    Immature Granulocytes (abs) 0.03 0.00 - 0.11 K/uL    NRBC (Absolute) 0.00 K/uL   COMP METABOLIC PANEL   Result Value Ref Range    Sodium 140 135 - 145 mmol/L    Potassium 3.3 (L) 3.6 - 5.5 mmol/L    Chloride 107 96 - 112 mmol/L    Co2 26 20 - 33 mmol/L    Anion Gap 7.0 0.0 - 11.9    Glucose 93 65 - 99 mg/dL    Bun 10 8 - 22 mg/dL    Creatinine 0.83 0.50 - 1.40 mg/dL    Calcium 9.4 8.5 - 10.5 mg/dL    AST(SGOT) 16 12 - 45 U/L    ALT(SGPT) 10 2 - 50 U/L    Alkaline Phosphatase 57 30 - 99 U/L    Total Bilirubin 0.5 0.1 - 1.5 mg/dL    Albumin 4.3 3.2 - 4.9 g/dL    Total Protein 7.3 6.0 - 8.2 g/dL    Globulin 3.0 1.9 - 3.5 g/dL    A-G Ratio 1.4 g/dL   TROPONIN   Result Value Ref Range    Troponin I 0.01 0.00 - 0.04 ng/mL   ESTIMATED GFR   Result Value Ref Range    GFR If African American >60 >60 mL/min/1.73 m 2    GFR If Non African American >60 >60 mL/min/1.73 m 2   EKG (NOW)   Result Value Ref Range    Report       [facility] Emergency Dept.    Test Date:  2019-05-13  Pt Name:    YAMILA ALTAMIRANO                Department: ER  MRN:         [id number]                      Room:        29  Gender:     Female                       Technician: [id number]  :        1985                   Requested By:ZHOU ASH  Order #:    [id number]                    Reading MD: ZHOU ASH MD    Measurements  Intervals                                Axis  Rate:       69                           P:          52  UT:         156                          QRS:        55  QRSD:       98                           T:          20  QT:         424  QTc:        455    Interpretive Statements  SINUS RHYTHM  PROBABLE LEFT ATRIAL ABNORMALITY  RSR' IN V1 OR V2, PROBABLY NORMAL VARIANT      Electronically Signed On 2019 1:29:39 PDT by ZHOU ASH MD           RADIOLOGY  DX-CHEST-2 VIEWS   Final Result         1.  No acute cardiopulmonary disease.        The radiologist's interpretation of all radiological studies have been reviewed by me.    COURSE & MEDICAL DECISION MAKING  Nursing notes, VS, PMSFHx reviewed in chart.    12:06 AM Patient seen and examined at bedside. . Ordered for CBC, CMP, troponin, ECG, x-ray to evaluate her symptoms.     1:29 AM  Patient reevaluated, updated on results, plan for discharge        Decision Making:  This is a 33 y.o. female presented with chest tightness and anxiety.  This does seem primarily related to anxiety given the nature of her symptoms, bilateral and facial paresthesias, and overall well appearance and lack of risk factors.  Patient just received insurance and is currently scheduling appointment with primary care and will follow up there. ACS is less likely given atypical nature of pain, ECG with no ischemic changes, negative troponin , HEART score of 0 and patient lack of major cardiac risk factors. Given these factors as well as patient age, risk of 30 day to 6 month mortality or ACS is low based on multiple studies. This was discussed with patient and need for follow up was emphasized. PE is less likely given  nature of pain not consistent with PE and PERC negative. Dissection, aneurysm and pneumothorax are unlikely given the pain is resolved, as well as the nature of the pain and lack of risk factors, such as smoking, as well as Xray lacking evidence of either. Pneumonia or other infection is less likely given no fever or infectious symptoms as well as no radiographic evidence. Other non emergent causes such as costochondritis, muscle strain,GI causes were also considered   The patient will return for new or worsening symptoms and is stable at the time of discharge.    The patient is referred to a primary physician for blood pressure management, diabetic screening, and for all other preventative health concerns.      DISPOSITION:  Patient will be discharged home in stable condition.    FOLLOW UP:  With your primary care doctor    Schedule an appointment as soon as possible for a visit         OUTPATIENT MEDICATIONS:  There are no discharge medications for this patient.          FINAL IMPRESSION  1. Anxiety    2. Chest pain, unspecified type          The note accurately reflects work and decisions made by me.  Guillermo Pugh  5/13/2019  2:40 AM

## 2019-05-13 NOTE — ED NOTES
"Pt arrived to ED w/out shoes or socks and Pt states \" I was at home sleeping and had a panic attack. Delaney been having them more frequently\"... Pt resting comfortably at this time.  "

## 2019-05-13 NOTE — ED TRIAGE NOTES
"Christiane Jones  33 y.o.  Chief Complaint   Patient presents with   • Panic Attack     starting within last 30 minutes, started while laying in bed with  watching movie     Ambulatory to triage with steady gait for above.    History of anxiety and panic attack x 1, states that this feels like her normal anxiety but worse.    States \"It feels like by entire body is being squeezed tight and it's hard to breathe.\"    Patient restless in triage. Warm blanket provided for comfort.    Triage process explained to patient, apologized for wait time, and returned to lobby.  "

## 2019-05-13 NOTE — ED NOTES
Pt discharged to home.  IV removed and bandaged.  No noted swelling or redness.  Pt tolerated well.  Catheter intact.  Pt given discharge paperwork and all applicable prescriptions written by provider.  Pt to follow up with PCP if indicated in discharge instructions.  Pt verbalized understanding of discharge teaching and will return to ED if condition worsens.

## 2020-08-23 PROCEDURE — 99285 EMERGENCY DEPT VISIT HI MDM: CPT

## 2020-08-23 ASSESSMENT — FIBROSIS 4 INDEX: FIB4 SCORE: 0.66

## 2020-08-24 ENCOUNTER — APPOINTMENT (OUTPATIENT)
Dept: RADIOLOGY | Facility: MEDICAL CENTER | Age: 35
End: 2020-08-24
Attending: EMERGENCY MEDICINE
Payer: MEDICAID

## 2020-08-24 ENCOUNTER — HOSPITAL ENCOUNTER (EMERGENCY)
Facility: MEDICAL CENTER | Age: 35
End: 2020-08-24
Attending: EMERGENCY MEDICINE
Payer: MEDICAID

## 2020-08-24 VITALS
RESPIRATION RATE: 17 BRPM | DIASTOLIC BLOOD PRESSURE: 62 MMHG | HEART RATE: 52 BPM | TEMPERATURE: 98.1 F | OXYGEN SATURATION: 96 % | SYSTOLIC BLOOD PRESSURE: 102 MMHG | BODY MASS INDEX: 31.15 KG/M2 | WEIGHT: 144.4 LBS | HEIGHT: 57 IN

## 2020-08-24 DIAGNOSIS — F41.9 ANXIETY: ICD-10-CM

## 2020-08-24 DIAGNOSIS — N93.9 VAGINAL BLEEDING: ICD-10-CM

## 2020-08-24 DIAGNOSIS — B96.89 BACTERIAL VAGINOSIS: ICD-10-CM

## 2020-08-24 DIAGNOSIS — N76.0 BACTERIAL VAGINOSIS: ICD-10-CM

## 2020-08-24 LAB
ALBUMIN SERPL BCP-MCNC: 3.9 G/DL (ref 3.2–4.9)
ALBUMIN/GLOB SERPL: 1.7 G/DL
ALP SERPL-CCNC: 66 U/L (ref 30–99)
ALT SERPL-CCNC: 19 U/L (ref 2–50)
ANION GAP SERPL CALC-SCNC: 13 MMOL/L (ref 7–16)
APPEARANCE UR: CLEAR
AST SERPL-CCNC: 16 U/L (ref 12–45)
BACTERIA #/AREA URNS HPF: NEGATIVE /HPF
BASOPHILS # BLD AUTO: 0.2 % (ref 0–1.8)
BASOPHILS # BLD: 0.03 K/UL (ref 0–0.12)
BILIRUB SERPL-MCNC: 0.2 MG/DL (ref 0.1–1.5)
BILIRUB UR QL STRIP.AUTO: NEGATIVE
BUN SERPL-MCNC: 15 MG/DL (ref 8–22)
CALCIUM SERPL-MCNC: 8.5 MG/DL (ref 8.5–10.5)
CANDIDA DNA VAG QL PROBE+SIG AMP: NEGATIVE
CHLORIDE SERPL-SCNC: 101 MMOL/L (ref 96–112)
CO2 SERPL-SCNC: 22 MMOL/L (ref 20–33)
COLOR UR: YELLOW
CREAT SERPL-MCNC: 0.68 MG/DL (ref 0.5–1.4)
D DIMER PPP IA.FEU-MCNC: 0.41 UG/ML (FEU) (ref 0–0.5)
EKG IMPRESSION: NORMAL
EOSINOPHIL # BLD AUTO: 0.4 K/UL (ref 0–0.51)
EOSINOPHIL NFR BLD: 3.2 % (ref 0–6.9)
EPI CELLS #/AREA URNS HPF: NEGATIVE /HPF
ERYTHROCYTE [DISTWIDTH] IN BLOOD BY AUTOMATED COUNT: 46.4 FL (ref 35.9–50)
G VAGINALIS DNA VAG QL PROBE+SIG AMP: POSITIVE
GLOBULIN SER CALC-MCNC: 2.3 G/DL (ref 1.9–3.5)
GLUCOSE SERPL-MCNC: 103 MG/DL (ref 65–99)
GLUCOSE UR STRIP.AUTO-MCNC: NEGATIVE MG/DL
HCG SERPL QL: NEGATIVE
HCT VFR BLD AUTO: 38.8 % (ref 37–47)
HGB BLD-MCNC: 13 G/DL (ref 12–16)
HYALINE CASTS #/AREA URNS LPF: ABNORMAL /LPF
IMM GRANULOCYTES # BLD AUTO: 0.07 K/UL (ref 0–0.11)
IMM GRANULOCYTES NFR BLD AUTO: 0.6 % (ref 0–0.9)
KETONES UR STRIP.AUTO-MCNC: NEGATIVE MG/DL
LEUKOCYTE ESTERASE UR QL STRIP.AUTO: NEGATIVE
LIPASE SERPL-CCNC: 59 U/L (ref 11–82)
LYMPHOCYTES # BLD AUTO: 2.44 K/UL (ref 1–4.8)
LYMPHOCYTES NFR BLD: 19.4 % (ref 22–41)
MCH RBC QN AUTO: 31.6 PG (ref 27–33)
MCHC RBC AUTO-ENTMCNC: 33.5 G/DL (ref 33.6–35)
MCV RBC AUTO: 94.4 FL (ref 81.4–97.8)
MICRO URNS: ABNORMAL
MONOCYTES # BLD AUTO: 0.75 K/UL (ref 0–0.85)
MONOCYTES NFR BLD AUTO: 6 % (ref 0–13.4)
NEUTROPHILS # BLD AUTO: 8.91 K/UL (ref 2–7.15)
NEUTROPHILS NFR BLD: 70.6 % (ref 44–72)
NITRITE UR QL STRIP.AUTO: NEGATIVE
NRBC # BLD AUTO: 0 K/UL
NRBC BLD-RTO: 0 /100 WBC
PH UR STRIP.AUTO: 6.5 [PH] (ref 5–8)
PLATELET # BLD AUTO: 246 K/UL (ref 164–446)
PMV BLD AUTO: 9.8 FL (ref 9–12.9)
POTASSIUM SERPL-SCNC: 3.7 MMOL/L (ref 3.6–5.5)
PROT SERPL-MCNC: 6.2 G/DL (ref 6–8.2)
PROT UR QL STRIP: NEGATIVE MG/DL
RBC # BLD AUTO: 4.11 M/UL (ref 4.2–5.4)
RBC # URNS HPF: ABNORMAL /HPF
RBC UR QL AUTO: ABNORMAL
SODIUM SERPL-SCNC: 136 MMOL/L (ref 135–145)
SP GR UR STRIP.AUTO: 1.02
T VAGINALIS DNA VAG QL PROBE+SIG AMP: NEGATIVE
TREPONEMA PALLIDUM IGG+IGM AB [PRESENCE] IN SERUM OR PLASMA BY IMMUNOASSAY: NORMAL
TROPONIN T SERPL-MCNC: <6 NG/L (ref 6–19)
TROPONIN T SERPL-MCNC: <6 NG/L (ref 6–19)
UROBILINOGEN UR STRIP.AUTO-MCNC: 1 MG/DL
WBC # BLD AUTO: 12.6 K/UL (ref 4.8–10.8)
WBC #/AREA URNS HPF: ABNORMAL /HPF

## 2020-08-24 PROCEDURE — 87491 CHLMYD TRACH DNA AMP PROBE: CPT

## 2020-08-24 PROCEDURE — 87660 TRICHOMONAS VAGIN DIR PROBE: CPT

## 2020-08-24 PROCEDURE — 81001 URINALYSIS AUTO W/SCOPE: CPT

## 2020-08-24 PROCEDURE — 93005 ELECTROCARDIOGRAM TRACING: CPT | Performed by: EMERGENCY MEDICINE

## 2020-08-24 PROCEDURE — 80053 COMPREHEN METABOLIC PANEL: CPT

## 2020-08-24 PROCEDURE — 85025 COMPLETE CBC W/AUTO DIFF WBC: CPT

## 2020-08-24 PROCEDURE — 96365 THER/PROPH/DIAG IV INF INIT: CPT

## 2020-08-24 PROCEDURE — A9270 NON-COVERED ITEM OR SERVICE: HCPCS | Performed by: EMERGENCY MEDICINE

## 2020-08-24 PROCEDURE — 87591 N.GONORRHOEAE DNA AMP PROB: CPT

## 2020-08-24 PROCEDURE — 86780 TREPONEMA PALLIDUM: CPT

## 2020-08-24 PROCEDURE — 83690 ASSAY OF LIPASE: CPT

## 2020-08-24 PROCEDURE — 87480 CANDIDA DNA DIR PROBE: CPT

## 2020-08-24 PROCEDURE — 87510 GARDNER VAG DNA DIR PROBE: CPT

## 2020-08-24 PROCEDURE — 84484 ASSAY OF TROPONIN QUANT: CPT | Mod: 91

## 2020-08-24 PROCEDURE — 84703 CHORIONIC GONADOTROPIN ASSAY: CPT

## 2020-08-24 PROCEDURE — 71045 X-RAY EXAM CHEST 1 VIEW: CPT

## 2020-08-24 PROCEDURE — 85379 FIBRIN DEGRADATION QUANT: CPT

## 2020-08-24 PROCEDURE — 700102 HCHG RX REV CODE 250 W/ 637 OVERRIDE(OP): Performed by: EMERGENCY MEDICINE

## 2020-08-24 PROCEDURE — 76856 US EXAM PELVIC COMPLETE: CPT

## 2020-08-24 PROCEDURE — 700111 HCHG RX REV CODE 636 W/ 250 OVERRIDE (IP): Performed by: EMERGENCY MEDICINE

## 2020-08-24 PROCEDURE — 700105 HCHG RX REV CODE 258: Performed by: EMERGENCY MEDICINE

## 2020-08-24 RX ORDER — AZITHROMYCIN 250 MG/1
1000 TABLET, FILM COATED ORAL ONCE
Status: COMPLETED | OUTPATIENT
Start: 2020-08-24 | End: 2020-08-24

## 2020-08-24 RX ORDER — CEFTRIAXONE SODIUM 250 MG/1
250 INJECTION, POWDER, FOR SOLUTION INTRAMUSCULAR; INTRAVENOUS ONCE
Status: DISCONTINUED | OUTPATIENT
Start: 2020-08-24 | End: 2020-08-24

## 2020-08-24 RX ORDER — METRONIDAZOLE 500 MG/1
500 TABLET ORAL 2 TIMES DAILY
Qty: 14 TAB | Refills: 0 | Status: SHIPPED | OUTPATIENT
Start: 2020-08-24 | End: 2020-08-31

## 2020-08-24 RX ADMIN — AZITHROMYCIN MONOHYDRATE 1000 MG: 250 TABLET ORAL at 03:54

## 2020-08-24 RX ADMIN — CEFTRIAXONE SODIUM 1000 MG: 1 INJECTION, POWDER, FOR SOLUTION INTRAMUSCULAR; INTRAVENOUS at 03:55

## 2020-08-24 NOTE — ED TRIAGE NOTES
Chief Complaint   Patient presents with   • Panic Attack     onset in car while going to get food, hx of same. Does not take any meds. Pt arrives with carpal spasms to right wrist. Not hyperventilating at this time. Calm.    • Vaginal Bleeding     took morning after pill on 8-17, vaginal bleeding started today, used 2 pads today.      Pt to triage via w/c with friend for above complaints. Educated on triage process, encouraged to inform staff of any changes.

## 2020-08-24 NOTE — ED PROVIDER NOTES
ED Provider Note    CHIEF COMPLAINT  Panic attack, vaginal bleeding    HPI  Christiane Jones is a 34 y.o. female who presents to emergency department for evaluation of a panic attack and vaginal bleeding.The patient states that she started having vaginal bleeding today.  She is gone through 3-4 pads throughout the whole day.  She states that she took the morning after pill a week ago.  She admits to lower abdominal pain as well.  She is sexually active and does not use protection.  She does have a history of STIs.  She states that this evening she became quite anxious and started developing fast breathing.  She states that both of her hands cramped and around her mouth went numb.  She came here for further evaluation and states that she now has a dull chest pressure.  She denies any history of tobacco use, hypertension, hyperlipidemia, or diabetes.  She has no family history of heart disease.  She has no history of previous blood clots.  She did travel to [place] recently but the flight was only 3-1/2 hours.  Has not had any recent hospitalizations or surgeries.  She denies any calf swelling or tenderness.  She has not had any fevers, coughing, wheezing, congestion, runny nose, shortness of breath, or syncope.  She has not been around anyone with COVID-19 that she is aware of.    REVIEW OF SYSTEMS  See HPI for further details. All other systems are negative.     PAST MEDICAL HISTORY   has a past medical history of Anxiety and Panic attack.    SOCIAL HISTORY  Social History     Tobacco Use   • Smoking status: Never Smoker   • Smokeless tobacco: Never Used   Substance and Sexual Activity   • Alcohol use: Yes     Comment: occasional   • Drug use: Yes     Types: Inhaled     Comment: occasional marijuana   • Sexual activity: Yes     Partners: Male     Birth control/protection: Condom       SURGICAL HISTORY   has a past surgical history that includes jorge by laparoscopy (9/14/2013).    CURRENT MEDICATIONS  Home Medications   "  **Home medications have not yet been reviewed for this encounter**         ALLERGIES  No Known Allergies    PHYSICAL EXAM  VITAL SIGNS: /64   Pulse 71   Temp 37 °C (98.6 °F) (Temporal)   Resp 20   Ht 1.448 m (4' 9\")   Wt 65.5 kg (144 lb 6.4 oz)   LMP 08/10/2020   SpO2 98%   BMI 31.25 kg/m²    Constitutional: Alert and in no apparent distress.  HENT: Normocephalic atraumatic. Bilateral external ears normal. Nose normal. Mucous membranes are moist.  Eyes: Pupils are equal and reactive. Conjunctiva normal. Non-icteric sclera.   Neck: Normal range of motion without tenderness. Supple. No meningeal signs.  Cardiovascular: Regular rate and rhythm. No murmurs, gallops or rubs.  Thorax & Lungs: Breath sounds are clear to auscultation bilaterally. No wheezing, rhonchi or rales.  Abdomen: Soft, nontender and nondistended. No peritoneal signs noted.  Pelvic: Chaperone - Racheal, ED RN.  Normal external genitalia.  There is a small amount of dark red blood in the vaginal vault.  Cervix is closed.  Skin: Warm and dry. No rashes are noted.  Back: No bony tenderness, No CVA tenderness.   Extremities: 2+ peripheral pulses. Cap refill is less than 2 seconds. No edema, cyanosis, or clubbing.  Musculoskeletal: Good range of motion in all major joints. No tenderness to palpation or major deformities noted.   Neurologic: Alert and oriented ×3. The patient moves all 4 extremities and follows commands.  Psychiatric: Affect is anxious. Judgment appears to be intact.    DIAGNOSTIC STUDIES / PROCEDURES    LABS  Results for orders placed or performed during the hospital encounter of 08/24/20   CBC WITH DIFFERENTIAL   Result Value Ref Range    WBC 12.6 (H) 4.8 - 10.8 K/uL    RBC 4.11 (L) 4.20 - 5.40 M/uL    Hemoglobin 13.0 12.0 - 16.0 g/dL    Hematocrit 38.8 37.0 - 47.0 %    MCV 94.4 81.4 - 97.8 fL    MCH 31.6 27.0 - 33.0 pg    MCHC 33.5 (L) 33.6 - 35.0 g/dL    RDW 46.4 35.9 - 50.0 fL    Platelet Count 246 164 - 446 K/uL    MPV " 9.8 9.0 - 12.9 fL    Neutrophils-Polys 70.60 44.00 - 72.00 %    Lymphocytes 19.40 (L) 22.00 - 41.00 %    Monocytes 6.00 0.00 - 13.40 %    Eosinophils 3.20 0.00 - 6.90 %    Basophils 0.20 0.00 - 1.80 %    Immature Granulocytes 0.60 0.00 - 0.90 %    Nucleated RBC 0.00 /100 WBC    Neutrophils (Absolute) 8.91 (H) 2.00 - 7.15 K/uL    Lymphs (Absolute) 2.44 1.00 - 4.80 K/uL    Monos (Absolute) 0.75 0.00 - 0.85 K/uL    Eos (Absolute) 0.40 0.00 - 0.51 K/uL    Baso (Absolute) 0.03 0.00 - 0.12 K/uL    Immature Granulocytes (abs) 0.07 0.00 - 0.11 K/uL    NRBC (Absolute) 0.00 K/uL   COMP METABOLIC PANEL   Result Value Ref Range    Sodium 136 135 - 145 mmol/L    Potassium 3.7 3.6 - 5.5 mmol/L    Chloride 101 96 - 112 mmol/L    Co2 22 20 - 33 mmol/L    Anion Gap 13.0 7.0 - 16.0    Glucose 103 (H) 65 - 99 mg/dL    Bun 15 8 - 22 mg/dL    Creatinine 0.68 0.50 - 1.40 mg/dL    Calcium 8.5 8.5 - 10.5 mg/dL    AST(SGOT) 16 12 - 45 U/L    ALT(SGPT) 19 2 - 50 U/L    Alkaline Phosphatase 66 30 - 99 U/L    Total Bilirubin 0.2 0.1 - 1.5 mg/dL    Albumin 3.9 3.2 - 4.9 g/dL    Total Protein 6.2 6.0 - 8.2 g/dL    Globulin 2.3 1.9 - 3.5 g/dL    A-G Ratio 1.7 g/dL   LIPASE   Result Value Ref Range    Lipase 59 11 - 82 U/L   TROPONIN   Result Value Ref Range    Troponin T <6 6 - 19 ng/L   D-DIMER   Result Value Ref Range    D-Dimer Screen 0.41 0.00 - 0.50 ug/mL (FEU)   URINALYSIS CULTURE, IF INDICATED    Specimen: Urine   Result Value Ref Range    Color Yellow     Character Clear     Specific Gravity 1.022 <1.035    Ph 6.5 5.0 - 8.0    Glucose Negative Negative mg/dL    Ketones Negative Negative mg/dL    Protein Negative Negative mg/dL    Bilirubin Negative Negative    Urobilinogen, Urine 1.0 Negative    Nitrite Negative Negative    Leukocyte Esterase Negative Negative    Occult Blood Large (A) Negative    Micro Urine Req Microscopic    HCG QUAL SERUM   Result Value Ref Range    Beta-Hcg Qualitative Serum Negative Negative   ESTIMATED GFR   Result  Value Ref Range    GFR If African American >60 >60 mL/min/1.73 m 2    GFR If Non African American >60 >60 mL/min/1.73 m 2   CHLAMYDIA & GC BY PCR    Specimen: Genital   Result Value Ref Range    Source Vaginal    T.PALLIDUM AB EIA (Syphilis)   Result Value Ref Range    Syphilis, Treponemal Qual Non-Reactive Non-Reactive   URINE MICROSCOPIC (W/UA)   Result Value Ref Range    WBC 2-5 /hpf    RBC 5-10 (A) /hpf    Bacteria Negative None /hpf    Epithelial Cells Negative /hpf    Hyaline Cast 0-2 /lpf   VAGINAL PATHOGENS DNA PANEL   Result Value Ref Range    Candida species DNA Probe Negative Negative    Trichamonas vaginalis DNA Probe Negative Negative    Gardnerella vaginalis DNA Probe POSITIVE (A) Negative   TROPONIN   Result Value Ref Range    Troponin T <6 6 - 19 ng/L   EKG (NOW)   Result Value Ref Range    Report       [facility] Emergency Dept.    Test Date:  2020  Pt Name:    YAMILA ALTAMIRANO                Department: ER  MRN:        [id number]                      Room:       [facility]  Gender:     Female                       Technician: [id number]  :        1985                   Requested By:CARA CORONA  Order #:    [id number]                    Reading MD: Cara Corona    Measurements  Intervals                                Axis  Rate:       52                           P:          67  NM:         152                          QRS:        68  QRSD:       82                           T:          53  QT:         452  QTc:        421    Interpretive Statements  SINUS BRADYCARDIA  RSR' IN V1 OR V2  No ST elevation or depression is noted.  Axis is within normal limits.  Intervals are within normal limits.  No arrhythmias are noted.  Similar to  previous EKG.  Impression: Sinus bradycardia, otherwise normal EKG.  Compared to ECG 2019 00:59:23  Electronically Signed On 20 1:04:24 PDT by Cara Corona       RADIOLOGY  US-PELVIC COMPLETE (TRANSABDOMINAL/TRANSVAGINAL) (COMBO)   Final Result          1.  Normal transvaginal appearance of the pelvis.      DX-CHEST-PORTABLE (1 VIEW)   Final Result         1.  No acute cardiopulmonary disease.        COURSE & MEDICAL DECISION MAKING  Pertinent Labs & Imaging studies reviewed. (See chart for details)    This  is a 34 y.o. female who presents to emergency department for evaluation of a panic attack and vaginal bleeding. On initial evaluation, the patient appeared well and in no acute distress.  Her vital signs were normal.  Her physical exam was overall reassuring.  An EKG was performed which did not reveal any evidence of ischemia or arrhythmia.  Troponins x2 were negative.  Her heart score was 0 and I extremely low clinical suspicion for ACS.  I did obtain a d-dimer given her history of recent travel and exogenous hormone use.  This was negative and I have low clinical suspicion for pulmonary embolism.  Given her vaginal bleeding, pelvic exam was performed and GC as well as syphilis were sent.  Syphilis was negative.  GC were pending and given her history of STIs, she was treated prophylactically here in the emergency department..  She was noted to be positive for bacterial vaginosis and started on Flagyl given the new vaginal bleeding.  Her H&H were normal and I have low suspicion for significant hemorrhage. Given her lower abdominal pain and vaginal bleeding, an ultrasound was ordered.  This was normal.  On reevaluation, the patient was resting comfortably and in no acute distress.  I suspect her symptoms are likely secondary to her anxiety.  I encouraged her to follow-up with a therapist and life skills was contacted and gave her some outpatient resources.  Additionally, I encouraged her to follow-up with a primary care physician and I gave her information for the hospitals clinic.  She will return to the emergency department any worsening signs or symptoms.    The patient has atypical chest pain as the patient's chest pain is not suggestive of pulmonary  embolus, cardiac ischemia, aortic dissection, or other serious etiology. Given the extremely low risk of these diagnoses further testing and evaluation for these possibilities does not appear to be indicated at this time. The patient has been instructed to return if the symptoms worsen or change in any way.    I verified that the patient was wearing a mask and I was wearing appropriate PPE every time I entered the room. The patient's mask was on the patient at all times during my encounter except for a brief view of the oropharynx.    FINAL IMPRESSION  1. Anxiety    2. Vaginal bleeding    3. Bacterial vaginosis        PRESCRIPTIONS  New Prescriptions    METRONIDAZOLE (FLAGYL) 500 MG TAB    Take 1 Tab by mouth 2 Times a Day for 7 days.     FOLLOW UP  [address] [address] [phone]  Call in 1 day  To schedule a follow up appointment    [facility], Emergency Dept  [address] [facility] [id number] [phone]  Go to   As needed      -DISCHARGE-        Electronically signed by: Cara Kelly D.O., 8/24/2020 12:28 AM

## 2020-08-24 NOTE — ED NOTES
Christiane Jones is being discharged from the Emergency Department in stable condition. Discharge and follow up instructions were given to patient.   Prescription was explained to the patient. Christiane Jones is alert and oriented and verbalizes understanding. VSS. The patient ambulates with steady gait.

## 2023-06-23 ENCOUNTER — HOSPITAL ENCOUNTER (EMERGENCY)
Facility: MEDICAL CENTER | Age: 38
End: 2023-06-23
Attending: EMERGENCY MEDICINE

## 2023-06-23 VITALS
RESPIRATION RATE: 20 BRPM | TEMPERATURE: 98.6 F | HEIGHT: 57 IN | DIASTOLIC BLOOD PRESSURE: 82 MMHG | HEART RATE: 82 BPM | OXYGEN SATURATION: 98 % | WEIGHT: 134.92 LBS | BODY MASS INDEX: 29.11 KG/M2 | SYSTOLIC BLOOD PRESSURE: 126 MMHG

## 2023-06-23 DIAGNOSIS — K08.89 PAIN, DENTAL: ICD-10-CM

## 2023-06-23 PROCEDURE — 99283 EMERGENCY DEPT VISIT LOW MDM: CPT

## 2023-06-23 PROCEDURE — A9270 NON-COVERED ITEM OR SERVICE: HCPCS | Performed by: EMERGENCY MEDICINE

## 2023-06-23 PROCEDURE — 700102 HCHG RX REV CODE 250 W/ 637 OVERRIDE(OP): Performed by: EMERGENCY MEDICINE

## 2023-06-23 RX ORDER — AMOXICILLIN 500 MG/1
1000 CAPSULE ORAL 2 TIMES DAILY
Qty: 28 CAPSULE | Refills: 0 | Status: ACTIVE | OUTPATIENT
Start: 2023-06-23 | End: 2023-06-30

## 2023-06-23 RX ORDER — AMOXICILLIN 500 MG/1
1000 CAPSULE ORAL 2 TIMES DAILY
Qty: 28 CAPSULE | Refills: 0 | Status: ACTIVE | OUTPATIENT
Start: 2023-06-23 | End: 2023-06-23 | Stop reason: SDUPTHER

## 2023-06-23 RX ORDER — HYDROCODONE BITARTRATE AND ACETAMINOPHEN 5; 325 MG/1; MG/1
1 TABLET ORAL ONCE
Status: COMPLETED | OUTPATIENT
Start: 2023-06-23 | End: 2023-06-23

## 2023-06-23 RX ORDER — IBUPROFEN 600 MG/1
600 TABLET ORAL ONCE
Status: COMPLETED | OUTPATIENT
Start: 2023-06-23 | End: 2023-06-23

## 2023-06-23 RX ADMIN — IBUPROFEN 600 MG: 600 TABLET, FILM COATED ORAL at 07:22

## 2023-06-23 RX ADMIN — HYDROCODONE BITARTRATE AND ACETAMINOPHEN 1 TABLET: 5; 325 TABLET ORAL at 07:11

## 2023-06-23 ASSESSMENT — PAIN DESCRIPTION - PAIN TYPE: TYPE: ACUTE PAIN

## 2023-06-23 ASSESSMENT — LIFESTYLE VARIABLES: DO YOU DRINK ALCOHOL: NO

## 2023-06-23 NOTE — ED TRIAGE NOTES
"Chief Complaint   Patient presents with    Dental Pain     Pt started having upper L sided dental pain starting yesterday. Pt states it is from a broken tooth in the very back on the upper side on the Left. Pt does not see a dentist regularly      BP (!) 139/90   Pulse 85   Temp (!) 35.6 °C (96.1 °F) (Temporal)   Resp 20   Ht 1.448 m (4' 9\")   Wt 61.2 kg (134 lb 14.7 oz)   SpO2 100%   BMI 29.20 kg/m²     Pt here for above cc  Pain is 10/10, pt is tearful in triage  Pt endorses trying to get into a dentist currently but cannot handle the pain as of right now and came to ED    Pt to lobby, educated on rooming process   "

## 2023-06-23 NOTE — DISCHARGE INSTRUCTIONS
Ibuprofen 600 mg 3 times daily  Tylenol 650 mg every 4 hours as needed    Return to ER for significant swelling, fever or concern    It is very important for you to see a dentist as soon as possible   Cardiac

## 2023-06-23 NOTE — ED NOTES
Pt has discharge orders. Pt educated on discharge instructions and new prescriptions.  Pt given one dose of Norco to take at home per ERP orders because pt is driving. Pt educated not to take until she is home and educated on the dangers of driving on pain medication. Pt verbalizes understanding.  Pt ambulatory to lobby with steady gait. Pt going home with self.

## 2023-06-23 NOTE — ED NOTES
Bedside report from CHARMAINE Dinero. Pt has discharge orders. Pt resting in bed with unlabored breathing. Call light within reach.

## 2023-06-23 NOTE — ED PROVIDER NOTES
" ED PHYSICIAN NOTE    CHIEF COMPLAINT  Chief Complaint   Patient presents with    Dental Pain     Pt started having upper L sided dental pain starting yesterday. Pt states it is from a broken tooth in the very back on the upper side on the Left. Pt does not see a dentist regularly        EXTERNAL RECORDS REVIEWED  Outpatient Notes outpatient encounters at [facility] a few years ago.  Most recent healthcare encounter in [facility] was in 2020 for panic attack    HPI/ROS    Christiane Jones is a 37 y.o. female who presents with left dental pain.  Patient bit down and broke her tooth yesterday.  Severe pain keeping her up all night.  No fever or swelling or blunt trauma.    PAST MEDICAL HISTORY  Past Medical History:   Diagnosis Date    Anxiety     Panic attack        SOCIAL HISTORY  Social History     Tobacco Use    Smoking status: Never    Smokeless tobacco: Never   Substance Use Topics    Alcohol use: Yes     Comment: occasional    Drug use: Yes     Types: Inhaled     Comment: occasional marijuana       CURRENT MEDICATIONS  Home Medications       Reviewed by Miroslava Telles R.N. (Registered Nurse) on 06/23/23 at 0636  Med List Status: Partial     Medication Last Dose Status        Patient Rickie Taking any Medications                           ALLERGIES  No Known Allergies    PHYSICAL EXAM  VITAL SIGNS: BP (!) 139/90   Pulse 85   Temp (!) 35.6 °C (96.1 °F) (Temporal)   Resp 20   Ht 1.448 m (4' 9\")   Wt 61.2 kg (134 lb 14.7 oz)   SpO2 100%   BMI 29.20 kg/m²    Constitutional: Awake and alert  HENT: Fractured left maxillary second molar.  Minimal surrounding swelling.  No facial swelling.  Eyes: Normal inspection  Neck: Grossly normal range of motion.  Cardiovascular: Normal heart rate  Thorax & Lungs: No respiratory distress  Extremities: Well perfused  Neurologic: Grossly normal   Psychiatric: Normal for situation        COURSE & MEDICAL DECISION MAKING      INITIAL ASSESSMENT, COURSE AND PLAN  Care Narrative: " Patient presents with complaints as above.  She has dental caries resulting in pain.  There is a slight amount of swelling.  Patient will be given antibiotics.  She is given a [place] in the emergency department.  Have advised Tylenol and ibuprofen at home.  Advised Orajel ice.  She needs to see a dentist soon as possible.  Refer to oral surgeon Dr. Ferris for dental extraction.  She is also given a dental referral sheet.  She is to return to the ER if she has fever, swelling, uncontrolled symptoms or concern      DISPOSITION AND DISCUSSIONS      Escalation of care considered, and ultimately not performed:blood analysis and diagnostic imaging.  Does not appear to be indicated without evidence of abscess systemic illness or toxicity    Barriers to care at this time, including but not limited to: Patient does not have insurance or a dentist.     Prescription drugs considered and/or prescribed: Considered prescription opiate but nonnarcotic analgesic would be most appropriate in the setting.  Prescribed amoxicillin    FINAL IMPRESSION  1.  Dental caries    This dictation was created using voice recognition software. The accuracy of the dictation is limited to the abilities of the software. I expect there may be some errors of grammar and possibly content. The nursing notes were reviewed and certain aspects of this information were incorporated into this note.    Electronically signed by: Pedro Skinner M.D., 6/23/2023

## 2023-06-23 NOTE — ED NOTES
Reports tooth ache to left side molar (last) x 12 hrs. Reports pain 10/10. ED MD at bedside assessing patient.

## 2024-06-21 ENCOUNTER — HOSPITAL ENCOUNTER (EMERGENCY)
Facility: MEDICAL CENTER | Age: 39
End: 2024-06-21
Attending: EMERGENCY MEDICINE
Payer: MEDICAID

## 2024-06-21 VITALS
HEIGHT: 57 IN | BODY MASS INDEX: 28.92 KG/M2 | WEIGHT: 134.04 LBS | RESPIRATION RATE: 15 BRPM | HEART RATE: 62 BPM | TEMPERATURE: 98 F | OXYGEN SATURATION: 95 % | DIASTOLIC BLOOD PRESSURE: 70 MMHG | SYSTOLIC BLOOD PRESSURE: 110 MMHG

## 2024-06-21 DIAGNOSIS — K62.5 RECTAL BLEEDING: ICD-10-CM

## 2024-06-21 DIAGNOSIS — K60.2 ANAL FISSURE: ICD-10-CM

## 2024-06-21 LAB
ALBUMIN SERPL BCP-MCNC: 4.4 G/DL (ref 3.2–4.9)
ALBUMIN/GLOB SERPL: 1.3 G/DL
ALP SERPL-CCNC: 77 U/L (ref 30–99)
ALT SERPL-CCNC: 19 U/L (ref 2–50)
ANION GAP SERPL CALC-SCNC: 14 MMOL/L (ref 7–16)
AST SERPL-CCNC: 23 U/L (ref 12–45)
BASOPHILS # BLD AUTO: 0.4 % (ref 0–1.8)
BASOPHILS # BLD: 0.07 K/UL (ref 0–0.12)
BILIRUB SERPL-MCNC: 0.3 MG/DL (ref 0.1–1.5)
BUN SERPL-MCNC: 7 MG/DL (ref 8–22)
CALCIUM ALBUM COR SERPL-MCNC: 8.7 MG/DL (ref 8.5–10.5)
CALCIUM SERPL-MCNC: 9 MG/DL (ref 8.5–10.5)
CHLORIDE SERPL-SCNC: 104 MMOL/L (ref 96–112)
CO2 SERPL-SCNC: 21 MMOL/L (ref 20–33)
CREAT SERPL-MCNC: 0.59 MG/DL (ref 0.5–1.4)
EOSINOPHIL # BLD AUTO: 0.33 K/UL (ref 0–0.51)
EOSINOPHIL NFR BLD: 1.8 % (ref 0–6.9)
ERYTHROCYTE [DISTWIDTH] IN BLOOD BY AUTOMATED COUNT: 43 FL (ref 35.9–50)
GFR SERPLBLD CREATININE-BSD FMLA CKD-EPI: 119 ML/MIN/1.73 M 2
GLOBULIN SER CALC-MCNC: 3.3 G/DL (ref 1.9–3.5)
GLUCOSE SERPL-MCNC: 96 MG/DL (ref 65–99)
HCG SERPL QL: NEGATIVE
HCT VFR BLD AUTO: 44.1 % (ref 37–47)
HGB BLD-MCNC: 14.8 G/DL (ref 12–16)
IMM GRANULOCYTES # BLD AUTO: 0.09 K/UL (ref 0–0.11)
IMM GRANULOCYTES NFR BLD AUTO: 0.5 % (ref 0–0.9)
LIPASE SERPL-CCNC: 53 U/L (ref 11–82)
LYMPHOCYTES # BLD AUTO: 1.75 K/UL (ref 1–4.8)
LYMPHOCYTES NFR BLD: 9.8 % (ref 22–41)
MCH RBC QN AUTO: 30.2 PG (ref 27–33)
MCHC RBC AUTO-ENTMCNC: 33.6 G/DL (ref 32.2–35.5)
MCV RBC AUTO: 90 FL (ref 81.4–97.8)
MONOCYTES # BLD AUTO: 0.94 K/UL (ref 0–0.85)
MONOCYTES NFR BLD AUTO: 5.3 % (ref 0–13.4)
NEUTROPHILS # BLD AUTO: 14.72 K/UL (ref 1.82–7.42)
NEUTROPHILS NFR BLD: 82.2 % (ref 44–72)
NRBC # BLD AUTO: 0 K/UL
NRBC BLD-RTO: 0 /100 WBC (ref 0–0.2)
PLATELET # BLD AUTO: 288 K/UL (ref 164–446)
PMV BLD AUTO: 10 FL (ref 9–12.9)
POTASSIUM SERPL-SCNC: 4.4 MMOL/L (ref 3.6–5.5)
PROT SERPL-MCNC: 7.7 G/DL (ref 6–8.2)
RBC # BLD AUTO: 4.9 M/UL (ref 4.2–5.4)
SODIUM SERPL-SCNC: 139 MMOL/L (ref 135–145)
WBC # BLD AUTO: 17.9 K/UL (ref 4.8–10.8)

## 2024-06-21 PROCEDURE — 80053 COMPREHEN METABOLIC PANEL: CPT

## 2024-06-21 PROCEDURE — 84703 CHORIONIC GONADOTROPIN ASSAY: CPT

## 2024-06-21 PROCEDURE — 700102 HCHG RX REV CODE 250 W/ 637 OVERRIDE(OP): Mod: UD | Performed by: EMERGENCY MEDICINE

## 2024-06-21 PROCEDURE — A9270 NON-COVERED ITEM OR SERVICE: HCPCS | Mod: UD | Performed by: EMERGENCY MEDICINE

## 2024-06-21 PROCEDURE — 83690 ASSAY OF LIPASE: CPT

## 2024-06-21 PROCEDURE — 36415 COLL VENOUS BLD VENIPUNCTURE: CPT

## 2024-06-21 PROCEDURE — 99283 EMERGENCY DEPT VISIT LOW MDM: CPT

## 2024-06-21 PROCEDURE — 46600 DIAGNOSTIC ANOSCOPY SPX: CPT

## 2024-06-21 PROCEDURE — 85025 COMPLETE CBC W/AUTO DIFF WBC: CPT

## 2024-06-21 RX ORDER — HYDROCODONE BITARTRATE AND ACETAMINOPHEN 5; 325 MG/1; MG/1
1 TABLET ORAL ONCE
Status: COMPLETED | OUTPATIENT
Start: 2024-06-21 | End: 2024-06-21

## 2024-06-21 RX ORDER — IBUPROFEN 600 MG/1
600 TABLET ORAL ONCE
Status: COMPLETED | OUTPATIENT
Start: 2024-06-21 | End: 2024-06-21

## 2024-06-21 RX ADMIN — HYDROCODONE BITARTRATE AND ACETAMINOPHEN 1 TABLET: 5; 325 TABLET ORAL at 06:59

## 2024-06-21 RX ADMIN — IBUPROFEN 600 MG: 600 TABLET, FILM COATED ORAL at 07:25

## 2024-06-21 ASSESSMENT — PAIN DESCRIPTION - PAIN TYPE: TYPE: ACUTE PAIN

## 2024-06-21 NOTE — ED NOTES
Bedside report received from off going RN/tech: CHARMAINE Duarte assumed care of patient.  POC discussed with patient. Call light within reach, all needs addressed at this time. Rectal exam supplies at BS.      Fall risk interventions in place: Not Applicable (all applicable per [place] Fall risk assessment)   Continuous monitoring: Not Applicable   IVF/IV medications: Not Applicable   Oxygen: Room Air  Bedside sitter: Not Applicable   Isolation: Not Applicable

## 2024-06-21 NOTE — ED TRIAGE NOTES
"Chief Complaint   Patient presents with    Bloody Stools     Pt reports bloody  stool, \"we were having anal intercourse yesterday and blood was pouring out.\"  Pt also c/o painful urination x1 day with bilateral lower abdominal pain.        Pt is alert and oriented, speaking in full sentences, follows commands and responds appropriately to questions. Resperations are even and unlabored.      Pt placed in lobby. Pt educated on triage process. Pt encouraged to alert staff for any changes.       /74   Pulse 87   Temp 37.1 °C (98.8 °F) (Temporal)   Resp 18   Ht 1.448 m (4' 9\")   Wt 60.8 kg (134 lb 0.6 oz)   SpO2 97%      "

## 2024-06-21 NOTE — ED NOTES
Pt ambulatory back to room [address] with steady gait. Pt placed into gown and placed on the monitor.  Pt states same complaints from triage.   Alert and oriented.  Chart up for ERP to see.

## 2024-06-21 NOTE — DISCHARGE INSTRUCTIONS
Please use stool softener to make the stool quite soft until pain-free and avoid anal intercourse  May take Tylenol and/or ibuprofen for pain control

## 2024-06-21 NOTE — ED PROVIDER NOTES
"ER Provider Note    Scribed for David Mayfield M.d. by Adrian Noel. 6/21/2024  6:46 AM    Primary Care Provider: Pcp Pt States None    CHIEF COMPLAINT   Chief Complaint   Patient presents with    Bloody Stools     Pt reports bloody  stool, \"we were having anal intercourse yesterday and blood was pouring out.\"  Pt also c/o painful urination x1 day with bilateral lower abdominal pain.        HPI/ROS  LIMITATION TO HISTORY   Select: : None  OUTSIDE HISTORIAN(S):  None    Christiane Jones is a 38 y.o. female who presents to the ED complaining of bloody stool after having anal intercourse onset yesterday. Patient reports accidental anal penetration while having intercourse with her  last night. She also reports urinary urgency, but has had urinary retention, stating that only small amounts of urine have been produced. She rates her rectal pain 8/10 at this time. She has been experiencing minimal abdominal pain. Patient denies any vomiting or nausea.    PAST MEDICAL HISTORY  Past Medical History:   Diagnosis Date    Anxiety     Panic attack        SURGICAL HISTORY  Past Surgical History:   Procedure Laterality Date    NADEGE BY LAPAROSCOPY  9/14/2013    Performed by Orville Ramos M.D. at SURGERY [facility] ORS       FAMILY HISTORY  Family History   Problem Relation Age of Onset    Diabetes Father         pills    Arthritis Mother     Diabetes Mother         pills    Arthritis Maternal Aunt     Cancer Maternal Grandmother     Diabetes Maternal Grandfather     Diabetes Maternal Aunt        SOCIAL HISTORY   reports that she has never smoked. She has never used smokeless tobacco. She reports current alcohol use. She reports current drug use. Drug: Inhaled.    ALLERGIES  Patient has no known allergies.    PHYSICAL EXAM  /74   Pulse 87   Temp 37.1 °C (98.8 °F) (Temporal)   Resp 18   Ht 1.448 m (4' 9\")   Wt 60.8 kg (134 lb 0.6 oz)   LMP 05/26/2024   SpO2 97%   BMI 29.01 kg/m²   Constitutional: Well " developed, Well nourished, No acute distress, Non-toxic appearance.   HENT: Normocephalic, Atraumatic, Bilateral external ears normal, Oropharynx is clear mucous membranes are moist. No oral exudates or nasal discharge.   Eyes: Pupils are equal round and reactive, EOMI, Conjunctiva normal, No discharge.   Neck: Normal range of motion, No tenderness, Supple, No stridor. No meningismus.  Lymphatic: No lymphadenopathy noted.   Cardiovascular: Regular rate and rhythm without murmur rub or gallop.  Thorax & Lungs: Clear breath sounds bilaterally without wheezes, rhonchi or rales. There is no chest wall tenderness.   Abdomen: Soft non-tender non-distended. There is no rebound or guarding. No organomegaly is appreciated. Bowel sounds are normal.   Anoscopy: Rectal fissure. Dorsal lithotomy at the 4 o'clock position. No active bleeding.  Skin: Normal without rash.   Back: No CVA or spinal tenderness.   Extremities: Intact distal pulses, No edema, No tenderness, No cyanosis, No clubbing. Capillary refill is less than 2 seconds.  Musculoskeletal: Good range of motion in all major joints. No tenderness to palpation or major deformities noted.   Neurologic: Alert & oriented x 3, Normal motor function, Normal sensory function, No focal deficits noted. Reflexes are normal.  Psychiatric: Affect normal, Judgment normal, Mood normal. There is no suicidal ideation or patient reported hallucinations.     DIAGNOSTIC STUDIES    EKG/LABS  Results for orders placed or performed during the hospital encounter of 06/21/24   CBC WITH DIFFERENTIAL   Result Value Ref Range    WBC 17.9 (H) 4.8 - 10.8 K/uL    RBC 4.90 4.20 - 5.40 M/uL    Hemoglobin 14.8 12.0 - 16.0 g/dL    Hematocrit 44.1 37.0 - 47.0 %    MCV 90.0 81.4 - 97.8 fL    MCH 30.2 27.0 - 33.0 pg    MCHC 33.6 32.2 - 35.5 g/dL    RDW 43.0 35.9 - 50.0 fL    Platelet Count 288 164 - 446 K/uL    MPV 10.0 9.0 - 12.9 fL    Neutrophils-Polys 82.20 (H) 44.00 - 72.00 %    Lymphocytes 9.80 (L)  22.00 - 41.00 %    Monocytes 5.30 0.00 - 13.40 %    Eosinophils 1.80 0.00 - 6.90 %    Basophils 0.40 0.00 - 1.80 %    Immature Granulocytes 0.50 0.00 - 0.90 %    Nucleated RBC 0.00 0.00 - 0.20 /100 WBC    Neutrophils (Absolute) 14.72 (H) 1.82 - 7.42 K/uL    Lymphs (Absolute) 1.75 1.00 - 4.80 K/uL    Monos (Absolute) 0.94 (H) 0.00 - 0.85 K/uL    Eos (Absolute) 0.33 0.00 - 0.51 K/uL    Baso (Absolute) 0.07 0.00 - 0.12 K/uL    Immature Granulocytes (abs) 0.09 0.00 - 0.11 K/uL    NRBC (Absolute) 0.00 K/uL   COMP METABOLIC PANEL   Result Value Ref Range    Sodium 139 135 - 145 mmol/L    Potassium 4.4 3.6 - 5.5 mmol/L    Chloride 104 96 - 112 mmol/L    Co2 21 20 - 33 mmol/L    Anion Gap 14.0 7.0 - 16.0    Glucose 96 65 - 99 mg/dL    Bun 7 (L) 8 - 22 mg/dL    Creatinine 0.59 0.50 - 1.40 mg/dL    Calcium 9.0 8.5 - 10.5 mg/dL    Correct Calcium 8.7 8.5 - 10.5 mg/dL    AST(SGOT) 23 12 - 45 U/L    ALT(SGPT) 19 2 - 50 U/L    Alkaline Phosphatase 77 30 - 99 U/L    Total Bilirubin 0.3 0.1 - 1.5 mg/dL    Albumin 4.4 3.2 - 4.9 g/dL    Total Protein 7.7 6.0 - 8.2 g/dL    Globulin 3.3 1.9 - 3.5 g/dL    A-G Ratio 1.3 g/dL   LIPASE   Result Value Ref Range    Lipase 53 11 - 82 U/L   HCG QUAL SERUM   Result Value Ref Range    Beta-Hcg Qualitative Serum Negative Negative   ESTIMATED GFR   Result Value Ref Range    GFR (CKD-EPI) 119 >60 mL/min/1.73 m 2       Anoscopy Procedure Note  Indication: Rectal bleeding    Procedure: The patient was placed in the left lateral decubitus position.  External inspection of the rectum and perianal area revealed an anal fissure at the 4 o' clock position.  A digital rectal exam was not performed. The anoscope was gently inserted and the bowel was inspected.  Visualization was excellent.  Mucosa showed anal fissure with no active bleeding.  The patient tolerated the procedure well.    Complication: None         COURSE & MEDICAL DECISION MAKING     ASSESSMENT, COURSE AND PLAN  Care Narrative:       6:57 AM  - Patient seen and examined at bedside.  She likely has an anal fissure or perhaps anal fissure plus internal hemorrhoids.  Discussed plan of care, including anoscopy and labs. Patient agrees to the plan of care. The patient will be medicated with Norco 5-325 mg tablet. Ordered for labs to evaluate her symptoms.     7:49 AM - I reevaluated the patient at bedside. Upon anoscopy examination, patient has a fissure at the 4 o'clock position. I discussed outpatient treatment of this finding. I discussed plan for discharge and follow up as outlined below. The patient is stable for discharge at this time and will return for any new or worsening symptoms. Patient verbalizes understanding and support with my plan for discharge.       DISPOSITION AND DISCUSSIONS  I have discussed management of the patient with the following physicians and FLY's:  None    Discussion of management with other QHP or appropriate source(s): None     Escalation of care considered, and ultimately not performed: diagnostic imaging.  Considered CT scan of the abdomen pelvis and specifically pelvis but have a low suspicion for perforation at this point and we will avoid this test for now    Barriers to care at this time, including but not limited to: Patient does not have established PCP.     The patient will return for new or worsening symptoms and is stable at the time of discharge.      DISPOSITION:  Patient will be discharged home in stable condition.    FOLLOW UP:  No follow-up provider specified.    OUTPATIENT MEDICATIONS:  New Prescriptions    No medications on file         FINAL DIANGOSIS  1. Rectal bleeding    2. Anal fissure           The note accurately reflects work and decisions made by me.  David Mayfield M.D.  6/21/2024  8:16 AM     Adrian IRENE), am scribing for, and in the presence of, David Mayfield M.D..    Electronically signed by: Adrian Almeida), 6/21/2024    David IRENE M.D. personally performed the  services described in this documentation, as scribed by Adrian Noel in my presence, and it is both accurate and complete.

## 2025-02-02 ENCOUNTER — PHARMACY VISIT (OUTPATIENT)
Dept: PHARMACY | Facility: MEDICAL CENTER | Age: 40
End: 2025-02-02
Payer: MEDICARE

## 2025-02-02 ENCOUNTER — HOSPITAL ENCOUNTER (EMERGENCY)
Facility: MEDICAL CENTER | Age: 40
End: 2025-02-02
Attending: EMERGENCY MEDICINE
Payer: COMMERCIAL

## 2025-02-02 VITALS
RESPIRATION RATE: 18 BRPM | OXYGEN SATURATION: 98 % | WEIGHT: 130.07 LBS | BODY MASS INDEX: 28.15 KG/M2 | TEMPERATURE: 98.2 F | HEART RATE: 77 BPM | DIASTOLIC BLOOD PRESSURE: 65 MMHG | SYSTOLIC BLOOD PRESSURE: 113 MMHG

## 2025-02-02 DIAGNOSIS — R11.2 NAUSEA AND VOMITING, UNSPECIFIED VOMITING TYPE: ICD-10-CM

## 2025-02-02 DIAGNOSIS — R19.7 DIARRHEA, UNSPECIFIED TYPE: ICD-10-CM

## 2025-02-02 LAB
ALBUMIN SERPL BCP-MCNC: 4.6 G/DL (ref 3.2–4.9)
ALBUMIN/GLOB SERPL: 1.4 G/DL
ALP SERPL-CCNC: 82 U/L (ref 30–99)
ALT SERPL-CCNC: 21 U/L (ref 2–50)
ANION GAP SERPL CALC-SCNC: 14 MMOL/L (ref 7–16)
APPEARANCE UR: CLEAR
AST SERPL-CCNC: 25 U/L (ref 12–45)
BACTERIA #/AREA URNS HPF: NORMAL /HPF
BASOPHILS # BLD AUTO: 0.4 % (ref 0–1.8)
BASOPHILS # BLD: 0.04 K/UL (ref 0–0.12)
BILIRUB SERPL-MCNC: 0.2 MG/DL (ref 0.1–1.5)
BILIRUB UR QL STRIP.AUTO: NEGATIVE
BUN SERPL-MCNC: 7 MG/DL (ref 8–22)
CALCIUM ALBUM COR SERPL-MCNC: 8.8 MG/DL (ref 8.5–10.5)
CALCIUM SERPL-MCNC: 9.3 MG/DL (ref 8.5–10.5)
CASTS URNS QL MICRO: NORMAL /LPF (ref 0–2)
CHLORIDE SERPL-SCNC: 102 MMOL/L (ref 96–112)
CO2 SERPL-SCNC: 27 MMOL/L (ref 20–33)
COLOR UR: YELLOW
CREAT SERPL-MCNC: 0.73 MG/DL (ref 0.5–1.4)
EOSINOPHIL # BLD AUTO: 0.14 K/UL (ref 0–0.51)
EOSINOPHIL NFR BLD: 1.3 % (ref 0–6.9)
EPITHELIAL CELLS 1715: NORMAL /HPF (ref 0–5)
ERYTHROCYTE [DISTWIDTH] IN BLOOD BY AUTOMATED COUNT: 42.5 FL (ref 35.9–50)
GFR SERPLBLD CREATININE-BSD FMLA CKD-EPI: 108 ML/MIN/1.73 M 2
GLOBULIN SER CALC-MCNC: 3.4 G/DL (ref 1.9–3.5)
GLUCOSE SERPL-MCNC: 115 MG/DL (ref 65–99)
GLUCOSE UR STRIP.AUTO-MCNC: NEGATIVE MG/DL
HCG SERPL QL: NEGATIVE
HCT VFR BLD AUTO: 42.4 % (ref 37–47)
HGB BLD-MCNC: 14.8 G/DL (ref 12–16)
IMM GRANULOCYTES # BLD AUTO: 0.05 K/UL (ref 0–0.11)
IMM GRANULOCYTES NFR BLD AUTO: 0.5 % (ref 0–0.9)
KETONES UR STRIP.AUTO-MCNC: NEGATIVE MG/DL
LEUKOCYTE ESTERASE UR QL STRIP.AUTO: NEGATIVE
LIPASE SERPL-CCNC: 30 U/L (ref 11–82)
LYMPHOCYTES # BLD AUTO: 1.38 K/UL (ref 1–4.8)
LYMPHOCYTES NFR BLD: 12.8 % (ref 22–41)
MCH RBC QN AUTO: 30.8 PG (ref 27–33)
MCHC RBC AUTO-ENTMCNC: 34.9 G/DL (ref 32.2–35.5)
MCV RBC AUTO: 88.3 FL (ref 81.4–97.8)
MICRO URNS: ABNORMAL
MONOCYTES # BLD AUTO: 0.52 K/UL (ref 0–0.85)
MONOCYTES NFR BLD AUTO: 4.8 % (ref 0–13.4)
NEUTROPHILS # BLD AUTO: 8.65 K/UL (ref 1.82–7.42)
NEUTROPHILS NFR BLD: 80.2 % (ref 44–72)
NITRITE UR QL STRIP.AUTO: NEGATIVE
NRBC # BLD AUTO: 0 K/UL
NRBC BLD-RTO: 0 /100 WBC (ref 0–0.2)
PH UR STRIP.AUTO: >=9 [PH] (ref 5–8)
PLATELET # BLD AUTO: 349 K/UL (ref 164–446)
PMV BLD AUTO: 9.6 FL (ref 9–12.9)
POTASSIUM SERPL-SCNC: 3.8 MMOL/L (ref 3.6–5.5)
PROT SERPL-MCNC: 8 G/DL (ref 6–8.2)
PROT UR QL STRIP: 100 MG/DL
RBC # BLD AUTO: 4.8 M/UL (ref 4.2–5.4)
RBC # URNS HPF: NORMAL /HPF (ref 0–2)
RBC UR QL AUTO: NEGATIVE
SODIUM SERPL-SCNC: 143 MMOL/L (ref 135–145)
SP GR UR STRIP.AUTO: 1.02
UROBILINOGEN UR STRIP.AUTO-MCNC: 1 EU/DL
WBC # BLD AUTO: 10.8 K/UL (ref 4.8–10.8)
WBC #/AREA URNS HPF: NORMAL /HPF

## 2025-02-02 PROCEDURE — 700111 HCHG RX REV CODE 636 W/ 250 OVERRIDE (IP): Mod: JZ,UD | Performed by: EMERGENCY MEDICINE

## 2025-02-02 PROCEDURE — 85025 COMPLETE CBC W/AUTO DIFF WBC: CPT

## 2025-02-02 PROCEDURE — RXMED WILLOW AMBULATORY MEDICATION CHARGE: Performed by: EMERGENCY MEDICINE

## 2025-02-02 PROCEDURE — 36415 COLL VENOUS BLD VENIPUNCTURE: CPT

## 2025-02-02 PROCEDURE — 84703 CHORIONIC GONADOTROPIN ASSAY: CPT

## 2025-02-02 PROCEDURE — 81001 URINALYSIS AUTO W/SCOPE: CPT

## 2025-02-02 PROCEDURE — 96374 THER/PROPH/DIAG INJ IV PUSH: CPT

## 2025-02-02 PROCEDURE — 83690 ASSAY OF LIPASE: CPT

## 2025-02-02 PROCEDURE — 700105 HCHG RX REV CODE 258: Mod: UD | Performed by: EMERGENCY MEDICINE

## 2025-02-02 PROCEDURE — 99285 EMERGENCY DEPT VISIT HI MDM: CPT

## 2025-02-02 PROCEDURE — 80053 COMPREHEN METABOLIC PANEL: CPT

## 2025-02-02 RX ORDER — ONDANSETRON 4 MG/1
4 TABLET, ORALLY DISINTEGRATING ORAL EVERY 8 HOURS PRN
Qty: 10 TABLET | Refills: 0 | Status: SHIPPED | OUTPATIENT
Start: 2025-02-02

## 2025-02-02 RX ORDER — SODIUM CHLORIDE 9 MG/ML
1000 INJECTION, SOLUTION INTRAVENOUS ONCE
Status: COMPLETED | OUTPATIENT
Start: 2025-02-02 | End: 2025-02-02

## 2025-02-02 RX ORDER — ONDANSETRON 2 MG/ML
4 INJECTION INTRAMUSCULAR; INTRAVENOUS ONCE
Status: COMPLETED | OUTPATIENT
Start: 2025-02-02 | End: 2025-02-02

## 2025-02-02 RX ADMIN — SODIUM CHLORIDE 1000 ML: 9 INJECTION, SOLUTION INTRAVENOUS at 18:58

## 2025-02-02 RX ADMIN — ONDANSETRON 4 MG: 2 INJECTION INTRAMUSCULAR; INTRAVENOUS at 18:59

## 2025-02-02 ASSESSMENT — PAIN DESCRIPTION - DESCRIPTORS: DESCRIPTORS: BURNING

## 2025-02-02 ASSESSMENT — FIBROSIS 4 INDEX: FIB4 SCORE: 0.68

## 2025-02-02 NOTE — Clinical Note
Christiane Jones was seen and treated in our emergency department on 2/2/2025.  She may return to work on 02/04/2025.       If you have any questions or concerns, please don't hesitate to call.      Gopi Marks M.D.

## 2025-02-03 NOTE — DISCHARGE INSTRUCTIONS
You very likely had a brief but intense viral illness that should resolve now that you are feeling better.  Appendicitis has not been ruled out but is very unlikely given that you have no abdominal pain.  You can use the Zofran as needed for nausea.  Keep well-hydrated at home.  Return if your symptoms worsen or change in any way.

## 2025-02-03 NOTE — ED NOTES
Pt. Ambulated to room from lobby with steady gait.  Reports unable to keep anything down since this AM.  Blood drawn by phlebotomist tricia to coming back to room.   Pt. Aware of need for urine sample, unable to provide at this time.      Awake/Alert

## 2025-02-03 NOTE — ED PROVIDER NOTES
ED Provider Note    CHIEF COMPLAINT  Chief Complaint   Patient presents with    Abdominal Pain    Nausea/Vomiting/Diarrhea       HPI  Christiane Jones is a 39 y.o. female who presents for evaluation of abdominal pain, nausea, vomiting, and diarrhea for 1 day.  Patient notes she woke up this morning around 9 AM with a cramping sensation in her upper abdomen near her stomach, and had an episode of diarrhea.  After that, she started having nausea and vomiting.  This is persisted all day long.  She notes she cannot keep even small sips of water down and feels very thirsty right now.  She notes she has had her gallbladder removed in the past and has had no other abdominal surgeries.  She has no lower abdominal pain and no measured fevers at home.  She denies heavy drinking and has no chronic medical issues.  She is not on any chronic medications.  EXTERNAL RECORDS REVIEWED  Reviewed last ED visit June 2024 for bloody stools.  An anal fissure was found and felt to be the cause.  ROS  Constitutional: No fevers or chills.  Fatigue and malaise noted.  Skin: No rashes  HEENT: No sore throat, or runny nose  Neck: No neck pain  Chest: No pain or rashes  Pulm: No shortness of breath, cough, wheezing, stridor, or pain with inspiration/expiration  Gastrointestinal: Nausea, vomiting, diarrhea, and upper abdominal pain noted.    Genitourinary: No dysuria or hematuria  Musculoskeletal: No pain, swelling, or focal weakness  Neurologic: No sensory or focal motor changes to extremities. No confusion or disorientation.  Heme: No bleeding or bruising problems.   Immuno: No hx of recurrent infections        LIMITATION TO HISTORY   None  OUTSIDE HISTORIAN(S):  None        PAST FAM HISTORY  Family History   Problem Relation Age of Onset    Diabetes Father         pills    Arthritis Mother     Diabetes Mother         pills    Arthritis Maternal Aunt     Cancer Maternal Grandmother     Diabetes Maternal Grandfather     Diabetes Maternal Aunt         PAST MEDICAL HISTORY   has a past medical history of Anxiety and Panic attack.    SOCIAL HISTORY  Social History     Tobacco Use    Smoking status: Never    Smokeless tobacco: Never   Substance and Sexual Activity    Alcohol use: Yes     Comment: occasional    Drug use: Yes     Types: Inhaled     Comment: occasional marijuana    Sexual activity: Yes     Partners: Male     Birth control/protection: Condom       SURGICAL HISTORY   has a past surgical history that includes jorge by laparoscopy (9/14/2013).    CURRENT MEDICATIONS  Home Medications       Reviewed by Rosetta Curry R.N. (Registered Nurse) on 02/02/25 at 1749  Med List Status: Partial     Medication Last Dose Status        Patient Rickie Taking any Medications                            ALLERGIES  No Known Allergies    PHYSICAL EXAM  VITAL SIGNS: /64   Pulse (!) 54   Temp 35.9 °C (96.7 °F) (Temporal)   Resp 18   Wt 59 kg (130 lb 1.1 oz)   LMP 01/25/2025 (Exact Date)   SpO2 97%   BMI 28.15 kg/m²    Gen: Appears tired, otherwise no apparent distress  HEENT: No signs of trauma, Bilateral external ears normal, Nose normal. Conjunctiva normal, Non-icteric.   Neck:  No tenderness, Supple, No masses  Lymphatic: No cervical lymphadenopathy noted.   Cardiovascular: Regular rate and rhythm, no murmurs.  Capillary refill less than 3 seconds to all extremities, 2+ distal pulses.  Thorax & Lungs: Normal breath sounds, No respiratory distress, No wheezing bilateral chest rise  Abdomen: Bowel sounds normal, Soft, mild tenderness in the epigastric region., No masses, No pulsatile masses. No Guarding or rebound, no McBurney's point tenderness  Skin: Warm, Dry, No erythema, No rash noted to exposed areas.   Extremities: Intact distal pulses, No edema  Neurologic: Alert , no facial droop, grossly normal coordination and strength  Psychiatric: Affect pleasant    INITIAL IMPRESSION  Patient arrives for evaluation of symptoms that would be suggestive of  gastroenteritis.  The likely sources are viral or foodborne, however notable that the patient has had a cholecystectomy in the past.  Seems unlikely that imaging will be necessary unless she experiences deterioration or has markedly abnormal labs.  Her white blood cell count has returned and is noted to be around 10.8.  There is no left shift but there is a mild neutrophil predominance.  This appears to be nonspecific.  She has no significant lower abdominal tenderness making appendicitis unlikely.  Patient states understanding we will empirically hydrate her via IV as she is unable to tolerate enough fluid to adequately rehydrate, and I will treat with Zofran.  Provided she recovered sufficiently to drink fluids, I feel she will likely be dischargeable.  If she experiences deterioration of the labs are markedly abnormal, will consider imaging.    ED observation? No    LABS  Results for orders placed or performed during the hospital encounter of 02/02/25   CBC WITH DIFFERENTIAL    Collection Time: 02/02/25  5:57 PM   Result Value Ref Range    WBC 10.8 4.8 - 10.8 K/uL    RBC 4.80 4.20 - 5.40 M/uL    Hemoglobin 14.8 12.0 - 16.0 g/dL    Hematocrit 42.4 37.0 - 47.0 %    MCV 88.3 81.4 - 97.8 fL    MCH 30.8 27.0 - 33.0 pg    MCHC 34.9 32.2 - 35.5 g/dL    RDW 42.5 35.9 - 50.0 fL    Platelet Count 349 164 - 446 K/uL    MPV 9.6 9.0 - 12.9 fL    Neutrophils-Polys 80.20 (H) 44.00 - 72.00 %    Lymphocytes 12.80 (L) 22.00 - 41.00 %    Monocytes 4.80 0.00 - 13.40 %    Eosinophils 1.30 0.00 - 6.90 %    Basophils 0.40 0.00 - 1.80 %    Immature Granulocytes 0.50 0.00 - 0.90 %    Nucleated RBC 0.00 0.00 - 0.20 /100 WBC    Neutrophils (Absolute) 8.65 (H) 1.82 - 7.42 K/uL    Lymphs (Absolute) 1.38 1.00 - 4.80 K/uL    Monos (Absolute) 0.52 0.00 - 0.85 K/uL    Eos (Absolute) 0.14 0.00 - 0.51 K/uL    Baso (Absolute) 0.04 0.00 - 0.12 K/uL    Immature Granulocytes (abs) 0.05 0.00 - 0.11 K/uL    NRBC (Absolute) 0.00 K/uL   COMP METABOLIC  PANEL    Collection Time: 02/02/25  5:57 PM   Result Value Ref Range    Sodium 143 135 - 145 mmol/L    Potassium 3.8 3.6 - 5.5 mmol/L    Chloride 102 96 - 112 mmol/L    Co2 27 20 - 33 mmol/L    Anion Gap 14.0 7.0 - 16.0    Glucose 115 (H) 65 - 99 mg/dL    Bun 7 (L) 8 - 22 mg/dL    Creatinine 0.73 0.50 - 1.40 mg/dL    Calcium 9.3 8.5 - 10.5 mg/dL    Correct Calcium 8.8 8.5 - 10.5 mg/dL    AST(SGOT) 25 12 - 45 U/L    ALT(SGPT) 21 2 - 50 U/L    Alkaline Phosphatase 82 30 - 99 U/L    Total Bilirubin 0.2 0.1 - 1.5 mg/dL    Albumin 4.6 3.2 - 4.9 g/dL    Total Protein 8.0 6.0 - 8.2 g/dL    Globulin 3.4 1.9 - 3.5 g/dL    A-G Ratio 1.4 g/dL   LIPASE    Collection Time: 02/02/25  5:57 PM   Result Value Ref Range    Lipase 30 11 - 82 U/L   HCG QUAL SERUM    Collection Time: 02/02/25  5:57 PM   Result Value Ref Range    Beta-Hcg Qualitative Serum Negative Negative   ESTIMATED GFR    Collection Time: 02/02/25  5:57 PM   Result Value Ref Range    GFR (CKD-EPI) 108 >60 mL/min/1.73 m 2   URINALYSIS,CULTURE IF INDICATED    Collection Time: 02/02/25  7:43 PM    Specimen: Urine   Result Value Ref Range    Micro Urine Req Microscopic          ASSESSMENT, COURSE AND PLAN  Care Narrative: 7:59 PM  Reevaluate patient at bedside.  She is resting quietly and feels much better both in terms of her nausea and pain.  She states she has no pain at this point in fact.  She has been taking sips of fluids and just gave a urine sample.  Will result a urine sample and plan on discharging the patient home with a prescription for Zofran as this is likely a transient gastroenteritis.  I do not feel imaging is necessary given the resolution of her pain and I suspect the symptoms will continue to resolve with conservative treatment.  She states understanding of this and is comfortable with this plan.    8:22 PM  Patient's urinalysis came back reassuring.  I had a final discussion with the patient and she is comfortable with the plan for discharge and  states understanding that appendicitis is not ruled out but felt to be very unlikely.  She will return if symptoms worsen or change in any way and otherwise keep well-hydrated at home and take Zofran as needed.        I have discussed management of the patient with the following physicians and FLY's: None    Escalation of care considered, and ultimately not performed: Imaging    Barriers to care at this time, including but not limited to: None.     Decision tools and Rx drugs considered including, but not limited to : Zofran    Discussion of management with other Q or appropriate source(s): None     The patient will return for worsening symptoms and is stable at the time of discharge. The patient verbalizes understanding and will comply.    FINAL IMPRESSION  1. Nausea and vomiting, unspecified vomiting type    2. Diarrhea, unspecified type        Electronically signed by: Gopi Marks M.D., 2/2/2025 6:39 PM

## 2025-03-11 ENCOUNTER — APPOINTMENT (OUTPATIENT)
Dept: RADIOLOGY | Facility: MEDICAL CENTER | Age: 40
End: 2025-03-11
Attending: EMERGENCY MEDICINE
Payer: COMMERCIAL

## 2025-03-11 ENCOUNTER — HOSPITAL ENCOUNTER (EMERGENCY)
Facility: MEDICAL CENTER | Age: 40
End: 2025-03-11
Attending: EMERGENCY MEDICINE
Payer: COMMERCIAL

## 2025-03-11 ENCOUNTER — PHARMACY VISIT (OUTPATIENT)
Dept: PHARMACY | Facility: MEDICAL CENTER | Age: 40
End: 2025-03-11
Payer: MEDICARE

## 2025-03-11 VITALS
RESPIRATION RATE: 16 BRPM | WEIGHT: 139.55 LBS | SYSTOLIC BLOOD PRESSURE: 114 MMHG | HEIGHT: 57 IN | BODY MASS INDEX: 30.11 KG/M2 | DIASTOLIC BLOOD PRESSURE: 67 MMHG | HEART RATE: 75 BPM | OXYGEN SATURATION: 94 % | TEMPERATURE: 97.5 F

## 2025-03-11 DIAGNOSIS — R19.7 DIARRHEA, UNSPECIFIED TYPE: ICD-10-CM

## 2025-03-11 DIAGNOSIS — R74.8 ELEVATED LIPASE: ICD-10-CM

## 2025-03-11 DIAGNOSIS — R10.13 EPIGASTRIC PAIN: ICD-10-CM

## 2025-03-11 LAB
ALBUMIN SERPL BCP-MCNC: 4.3 G/DL (ref 3.2–4.9)
ALBUMIN/GLOB SERPL: 1.3 G/DL
ALP SERPL-CCNC: 77 U/L (ref 30–99)
ALT SERPL-CCNC: 18 U/L (ref 2–50)
ANION GAP SERPL CALC-SCNC: 11 MMOL/L (ref 7–16)
AST SERPL-CCNC: 24 U/L (ref 12–45)
BASOPHILS # BLD AUTO: 0.2 % (ref 0–1.8)
BASOPHILS # BLD: 0.03 K/UL (ref 0–0.12)
BILIRUB SERPL-MCNC: 0.4 MG/DL (ref 0.1–1.5)
BUN SERPL-MCNC: 10 MG/DL (ref 8–22)
CALCIUM ALBUM COR SERPL-MCNC: 9.4 MG/DL (ref 8.5–10.5)
CALCIUM SERPL-MCNC: 9.6 MG/DL (ref 8.5–10.5)
CHLORIDE SERPL-SCNC: 100 MMOL/L (ref 96–112)
CO2 SERPL-SCNC: 23 MMOL/L (ref 20–33)
CREAT SERPL-MCNC: 0.7 MG/DL (ref 0.5–1.4)
EOSINOPHIL # BLD AUTO: 0.35 K/UL (ref 0–0.51)
EOSINOPHIL NFR BLD: 2.7 % (ref 0–6.9)
ERYTHROCYTE [DISTWIDTH] IN BLOOD BY AUTOMATED COUNT: 42.5 FL (ref 35.9–50)
ETHANOL BLD-MCNC: <10.1 MG/DL
GFR SERPLBLD CREATININE-BSD FMLA CKD-EPI: 114 ML/MIN/1.73 M 2
GLOBULIN SER CALC-MCNC: 3.3 G/DL (ref 1.9–3.5)
GLUCOSE SERPL-MCNC: 102 MG/DL (ref 65–99)
HCG SERPL QL: NEGATIVE
HCT VFR BLD AUTO: 42.3 % (ref 37–47)
HGB BLD-MCNC: 14.4 G/DL (ref 12–16)
IMM GRANULOCYTES # BLD AUTO: 0.05 K/UL (ref 0–0.11)
IMM GRANULOCYTES NFR BLD AUTO: 0.4 % (ref 0–0.9)
LIPASE SERPL-CCNC: 86 U/L (ref 11–82)
LYMPHOCYTES # BLD AUTO: 2.2 K/UL (ref 1–4.8)
LYMPHOCYTES NFR BLD: 17.3 % (ref 22–41)
MCH RBC QN AUTO: 30.6 PG (ref 27–33)
MCHC RBC AUTO-ENTMCNC: 34 G/DL (ref 32.2–35.5)
MCV RBC AUTO: 90 FL (ref 81.4–97.8)
MONOCYTES # BLD AUTO: 0.89 K/UL (ref 0–0.85)
MONOCYTES NFR BLD AUTO: 7 % (ref 0–13.4)
NEUTROPHILS # BLD AUTO: 9.21 K/UL (ref 1.82–7.42)
NEUTROPHILS NFR BLD: 72.4 % (ref 44–72)
NRBC # BLD AUTO: 0 K/UL
NRBC BLD-RTO: 0 /100 WBC (ref 0–0.2)
PLATELET # BLD AUTO: 280 K/UL (ref 164–446)
PMV BLD AUTO: 9.9 FL (ref 9–12.9)
POTASSIUM SERPL-SCNC: 4.1 MMOL/L (ref 3.6–5.5)
PROT SERPL-MCNC: 7.6 G/DL (ref 6–8.2)
RBC # BLD AUTO: 4.7 M/UL (ref 4.2–5.4)
SODIUM SERPL-SCNC: 134 MMOL/L (ref 135–145)
WBC # BLD AUTO: 12.7 K/UL (ref 4.8–10.8)

## 2025-03-11 PROCEDURE — 82077 ASSAY SPEC XCP UR&BREATH IA: CPT

## 2025-03-11 PROCEDURE — 83690 ASSAY OF LIPASE: CPT

## 2025-03-11 PROCEDURE — 76705 ECHO EXAM OF ABDOMEN: CPT

## 2025-03-11 PROCEDURE — 36415 COLL VENOUS BLD VENIPUNCTURE: CPT

## 2025-03-11 PROCEDURE — 84703 CHORIONIC GONADOTROPIN ASSAY: CPT

## 2025-03-11 PROCEDURE — 700102 HCHG RX REV CODE 250 W/ 637 OVERRIDE(OP): Mod: UD | Performed by: EMERGENCY MEDICINE

## 2025-03-11 PROCEDURE — 85025 COMPLETE CBC W/AUTO DIFF WBC: CPT

## 2025-03-11 PROCEDURE — A9270 NON-COVERED ITEM OR SERVICE: HCPCS | Mod: UD | Performed by: EMERGENCY MEDICINE

## 2025-03-11 PROCEDURE — 99284 EMERGENCY DEPT VISIT MOD MDM: CPT

## 2025-03-11 PROCEDURE — RXMED WILLOW AMBULATORY MEDICATION CHARGE: Performed by: EMERGENCY MEDICINE

## 2025-03-11 PROCEDURE — 80053 COMPREHEN METABOLIC PANEL: CPT

## 2025-03-11 RX ORDER — DICYCLOMINE HCL 20 MG
20 TABLET ORAL ONCE
Status: COMPLETED | OUTPATIENT
Start: 2025-03-11 | End: 2025-03-11

## 2025-03-11 RX ORDER — LOPERAMIDE HYDROCHLORIDE 2 MG/1
2 CAPSULE ORAL ONCE
Status: COMPLETED | OUTPATIENT
Start: 2025-03-11 | End: 2025-03-11

## 2025-03-11 RX ORDER — LOPERAMIDE HYDROCHLORIDE 2 MG/1
2 CAPSULE ORAL 4 TIMES DAILY PRN
Qty: 30 CAPSULE | Refills: 0 | Status: SHIPPED | OUTPATIENT
Start: 2025-03-11

## 2025-03-11 RX ORDER — DICYCLOMINE HCL 20 MG
20 TABLET ORAL EVERY 6 HOURS PRN
Qty: 12 TABLET | Refills: 0 | Status: SHIPPED | OUTPATIENT
Start: 2025-03-11 | End: 2025-03-14

## 2025-03-11 RX ADMIN — LOPERAMIDE HYDROCHLORIDE 2 MG: 2 CAPSULE ORAL at 01:43

## 2025-03-11 RX ADMIN — DICYCLOMINE HYDROCHLORIDE 20 MG: 20 TABLET ORAL at 01:43

## 2025-03-11 ASSESSMENT — FIBROSIS 4 INDEX: FIB4 SCORE: 0.58

## 2025-03-11 NOTE — ED NOTES
Pt ambulated to room with steady gait, pt connected to monitor, call light within reach. Chart up for ERP.    Subsequent Stages Histo Method Verbiage: Using a similar technique to that described above, a thin layer of tissue was removed from all areas where tumor was visible on the previous stage.  The tissue was again oriented, mapped, dyed, and processed as above.

## 2025-03-11 NOTE — ED PROVIDER NOTES
ED Provider Note    CHIEF COMPLAINT  Chief Complaint   Patient presents with    Diarrhea    Abdominal Pain       HPI/ROS  LIMITATION TO HISTORY   Select: : None  OUTSIDE HISTORIAN(S):  brady Jones is a 39 y.o. female who presents to the emergency department chief complaint of about 2 to 3 days of generalized abdominal cramping and diarrhea.  Some nausea but no vomiting.  She states that maybe she thought she was lactose intolerant but she has been having mild epigastric, with the diarrhea and feels like every time she is just comes on out.  No bloody stools no bloody emesis.  No dysuria no hematuria no flank pain.  States she just got some cramping and she just kind of tired.  She is been taken over-the-counter Pepto-Bismol without relief of symptoms.  She does not drink alcohol she has had a cholecystectomy in 2013    PAST MEDICAL HISTORY   has a past medical history of Anxiety and Panic attack.    SURGICAL HISTORY   has a past surgical history that includes jorge by laparoscopy (9/14/2013).    FAMILY HISTORY  Family History   Problem Relation Age of Onset    Diabetes Father         pills    Arthritis Mother     Diabetes Mother         pills    Arthritis Maternal Aunt     Cancer Maternal Grandmother     Diabetes Maternal Grandfather     Diabetes Maternal Aunt        SOCIAL HISTORY  Social History     Tobacco Use    Smoking status: Never    Smokeless tobacco: Never   Substance and Sexual Activity    Alcohol use: Yes     Comment: occasional    Drug use: Yes     Types: Inhaled     Comment: occasional marijuana    Sexual activity: Yes     Partners: Male     Birth control/protection: Condom       CURRENT MEDICATIONS  Home Medications       Reviewed by Miryam Moody R.N. (Registered Nurse) on 03/11/25 at 0018  Med List Status: Not Addressed     Medication Last Dose Status   ondansetron (ZOFRAN ODT) 4 MG TABLET DISPERSIBLE  Active                    ALLERGIES  No Known Allergies    PHYSICAL EXAM  VITAL SIGNS: BP  "113/87   Pulse 62   Temp 36.4 °C (97.5 °F) (Temporal)   Resp 16   Ht 1.448 m (4' 9\")   Wt 63.3 kg (139 lb 8.8 oz)   LMP 02/20/2025 (Exact Date)   SpO2 98%   BMI 30.20 kg/m²    Pulse OX: Pulse Oxygen level is the normal limits on room air  Constitutional: Alert in no apparent distress.  HENT: Normocephalic, Atraumatic, MMM  Eyes: PERound. Conjunctiva normal, non-icteric.   Heart: Regular rate and rhythm, intact distal pulses   Lungs: Symmetrical movement, no resp distress   Abdomen: Mild epigastric discomfort and right upper quadrant tenderness no rebound no guarding non-distended, normal bowel sounds  EXT/Back no edema  Skin: Warm, Dry, No erythema, No rash.   Neurologic: Alert and oriented, Grossly non-focal.       EKG/LABS  Labs Reviewed   CBC WITH DIFFERENTIAL - Abnormal; Notable for the following components:       Result Value    WBC 12.7 (*)     Neutrophils-Polys 72.40 (*)     Lymphocytes 17.30 (*)     Neutrophils (Absolute) 9.21 (*)     Monos (Absolute) 0.89 (*)     All other components within normal limits   COMP METABOLIC PANEL - Abnormal; Notable for the following components:    Sodium 134 (*)     Glucose 102 (*)     All other components within normal limits   LIPASE - Abnormal; Notable for the following components:    Lipase 86 (*)     All other components within normal limits   HCG QUAL SERUM   ESTIMATED GFR   DIAGNOSTIC ALCOHOL       I have independently interpreted this EKG    RADIOLOGY/PROCEDURES   I have independently interpreted the diagnostic imaging associated with this visit and am waiting the final reading from the radiologist.   My preliminary interpretation is as follows:     Right upper quadrant ultrasound without significant evidence of dilated CBD that would indicate gallstones    Radiologist interpretation:  US-RUQ   Final Result         1.  Hepatomegaly, otherwise unremarkable right upper quadrant sonogram          COURSE & MEDICAL DECISION MAKING    ASSESSMENT, COURSE AND " PLAN  Care Narrative:     Patient is a 39-year-old female presents to the ED likely with a viral gastroenteritis gastritis peptic ulcer disease atypical pancreatitis colitis.  She was treated here in the ED with Imodium and Bentyl for the diarrhea just the belly cramping.  Not feeling nauseated currently nonperitoneal on examination.    DISPOSITION AND DISCUSSIONS  Secondary to mildly elevated lipase right upper quadrant ultrasound was ordered as well as an alcohol level.      2:26 AM  Reassessed patient at the bedside she is feeling better after the medications actually getting some rest.  She does have a mildly elevated lipase but no evidence of gallstones or other obstructive process.  Could be secondary to a viral gastroenteritis but either way she feels comfortable going home with gentle liquid diet for the next 48 hours as well as Imodium as needed for the diarrhea Bentyl as needed for the bowel cramping ibuprofen Tylenol as needed and strict return precautions.  She understands feels comfortable going home        I have discussed management of the patient with the following physicians and FLY's:  none    Discussion of management with other QHP or appropriate source(s): None     Escalation of care considered, and ultimately not performed:acute inpatient care management, however at this time, the patient is most appropriate for outpatient management    Barriers to care at this time, including but not limited to:  na .     Decision tools and prescription drugs considered including, but not limited to: Medication modification Bentyl Imodium .    The patient will return for new or worsening symptoms and is stable at the time of discharge.    The patient is referred to a primary physician for blood pressure management, diabetic screening, and for all other preventative health concerns.    DISPOSITION:  Patient will be discharged home in stable condition.    FOLLOW UP:  [facility], Emergency  Dept  [address] [place] [id number]  [phone]    If symptoms worsen      OUTPATIENT MEDICATIONS:  Discharge Medication List as of 3/11/2025  2:53 AM        START taking these medications    Details   dicyclomine (BENTYL) 20 MG Tab Take 1 Tablet by mouth every 6 hours as needed (abdominal cramping) for up to 3 days., Disp-12 Tablet, R-0, Normal      loperamide (IMODIUM) 2 MG Cap Take 1 Capsule by mouth 4 times a day as needed for Diarrhea., Disp-30 Capsule, R-0, Normal               FINAL DIAGNOSIS  1. Epigastric pain    2. Elevated lipase    3. Diarrhea, unspecified type         Electronically signed by: Ebony Pham M.D., 3/11/2025 1:02 AM

## 2025-03-11 NOTE — DISCHARGE INSTRUCTIONS
Clear liquid diet for the next 2 days just to give your belly rest you can use the medications for the bowel cramping as well as for diarrhea if needed.  Please return to the ED for any new worsening issues such as worsening pain or vomiting.

## 2025-03-11 NOTE — ED TRIAGE NOTES
Christiane Robert  39 y.o. female    Chief Complaint   Patient presents with    Diarrhea    Abdominal Pain     Pt arrives with generalized abdominal pain and watery diarrhea that started on Friday. Also reports some nausea. Abd pain is intermittent. Denies fevers.     Vitals:    03/11/25 0011   BP: 113/87   Pulse: 62   Resp: 16   Temp: 36.4 °C (97.5 °F)   SpO2: 98%       Triage process explained to patient, apologized for wait time, and returned to lobby.  Pt informed to notify staff of any change in condition.

## 2025-03-11 NOTE — ED NOTES
"Christiane Jones has been discharged from the Emergency Room.    Pt in possession of belongings.    Discharge instructions, which include signs and symptoms to monitor patient for, as well as detailed information regarding epigastric pain provided.  Patient verbalizes understanding of follow up care and medication management. All questions and concerns addressed at this time.     Patient provided with education on when to return to the ER and verbally understands with no concerns. Patient advised on setting up MyChart and information provided about patient survey.  Patient leaves ER in no apparent distress. This RN provided education regarding returning to the ER for any new concerns or changes in patient's condition.      /67   Pulse 75   Temp 36.4 °C (97.5 °F) (Temporal)   Resp 16   Ht 1.448 m (4' 9\")   Wt 63.3 kg (139 lb 8.8 oz)   LMP 02/20/2025 (Exact Date)   SpO2 94%   BMI 30.20 kg/m²    "

## 2025-03-11 NOTE — Clinical Note
Christiane Jones was seen and treated in our emergency department on 3/11/2025.  She may return to work on 03/14/2025.       If you have any questions or concerns, please don't hesitate to call.      Ebony Pham M.D.

## 2025-06-02 ENCOUNTER — OCCUPATIONAL MEDICINE (OUTPATIENT)
Dept: URGENT CARE | Facility: CLINIC | Age: 40
End: 2025-06-02
Payer: COMMERCIAL

## 2025-06-02 VITALS
HEIGHT: 57 IN | HEART RATE: 61 BPM | BODY MASS INDEX: 29.99 KG/M2 | OXYGEN SATURATION: 100 % | DIASTOLIC BLOOD PRESSURE: 80 MMHG | TEMPERATURE: 98.6 F | WEIGHT: 139 LBS | SYSTOLIC BLOOD PRESSURE: 114 MMHG | RESPIRATION RATE: 14 BRPM

## 2025-06-02 DIAGNOSIS — G56.03 CARPAL TUNNEL SYNDROME ON BOTH SIDES: Primary | ICD-10-CM

## 2025-06-02 DIAGNOSIS — M65.4 DE QUERVAIN'S TENOSYNOVITIS, RIGHT: ICD-10-CM

## 2025-06-02 PROCEDURE — 99203 OFFICE O/P NEW LOW 30 MIN: CPT | Performed by: PHYSICIAN ASSISTANT

## 2025-06-02 ASSESSMENT — ENCOUNTER SYMPTOMS
TINGLING: 1
MYALGIAS: 1

## 2025-06-02 ASSESSMENT — FIBROSIS 4 INDEX: FIB4 SCORE: 0.75

## 2025-06-02 NOTE — LETTER
"  EMPLOYEE’S CLAIM FOR COMPENSATION/ REPORT OF INITIAL TREATMENT  FORM C-4  PLEASE TYPE OR PRINT    EMPLOYEE’S CLAIM - PROVIDE ALL INFORMATION REQUESTED   First Name                    QUEENIE Grande                  Last Name  Robert Birthdate                    1985                Sex  [x]Female Claim Number (Insurer’s Use Only)     Mailing Address  [address] Age  39 y.o. Height  1.448 m (4' 9\") Weight  63 kg (139 lb) Social Security Number     [facility] Zip  [address] Telephone  [phone] (home)    Email  [email or web address]    Primary Language Spoken  English    INSURER   THIRD-PARTY   [employer]   Employee's Occupation (Job Title) When Injury or Occupational Disease Occurred      Employer's Name/Company Name  [employer]  Telephone  [phone]    Office Mail Address (Number and Street)  [address]     Date of Injury (if applicable)        5/27/25        Hours Injury (if applicable)   am  10-11  pm Date Employer Notified  5/27/25 Last Day of Work after Injury or Occupational Disease  06/01/25 Supervisor to Whom Injury Reported  Nicanor Patel    Address or Location of Accident (if applicable)  Work    What were you doing at the time of accident? (if applicable)  Opening a box       How did this injury or occupational disease occur? (Be specific and answer in detail. Use additional sheet if necessary)  I was already having pain due to repetitive moverment from working with the torkoue tool. And when I went to open a box the  got stuck and I pulled thru yanked a little and hurt my wrist and thumb     If you believe that you have an occupational disease, when did you first have knowledge of the disability and its relationship to your employment?  n/a Witnesses to the Accident (if applicable)  n/a      Nature of Injury or Occupational Disease         strain    Part(s) of " Body Injured or Affected    R Wrist R thumb     I CERTIFY THAT THE ABOVE IS TRUE AND CORRECT TO T HE BEST OF MY KNOWLEDGE AND THAT I HAVE PROVIDED THIS INFORMATION IN ORDER TO OBTAIN THE BENEFITS OF [place]CC[place], OR CHAPTER 617 [place]).  I HEREBY AUTHORIZE ANY PHYSICIAN, CHIROPRACTOR, SURGEON, PRACTITIONER OR ANY OTHER PERSON, ANY HOSPITAL, INCLUDING [facility] OR [facility], ANY  MEDICAL SERVICE ORGANIZATION, ANY INSURANCE COMPANY, OR OTHER INSTITUTION OR ORGANIZATION TO RELEASE TO EACH OTHER, ANY MEDICAL OR OTHER INFORMATION, INCLUDING BENEFITS PAID OR PAYABLE, PERTINENT TO THIS INJURY OR DISEASE, EXCEPT INFORMATION RELATIVE TO DIAGNOSIS, TREATMENT AND/OR COUNSELING FOR AIDS, PSYCHOLOGICAL CONDITIONS, ALCOHOL OR CONTROLLED SUBSTANCES, FOR WHICH I MUST GIVE SPECIFIC AUTHORIZATION.  A PHOTOSTAT OF THIS AUTHORIZATION SHALL BE VALID AS THE ORIGINAL.     Date 06/02/25   Place [facility]  Employee’s Original or  *Electronic Signature   THIS REPORT MUST BE COMPLETED AND MAILED WITHIN 3 WORKING DAYS OF TREATMENT   Place  [facility]    Name of [facility]   Date 6/2/2025 Diagnosis and Description of Injury or Occupational Disease  (G56.03) Carpal tunnel syndrome on both sides  (primary encounter diagnosis)  (M65.4) De Quervain's tenosynovitis, right  The primary encounter diagnosis was Carpal tunnel syndrome on both sides. A diagnosis of De Quervain's tenosynovitis, right was also pertinent to this visit. Is there evidence that the injured employee was under the influence of alcohol and/or another controlled substance at the time of accident?  []No  [] Yes (if yes, please explain)   Hour 1:00 PM  No   Treatment: Patient's ongoing bilateral hand numbness and tingling consistent with carpal tunnel syndrome.  Discussed a trial of anti-inflammatories to take with food.  We also discussed a trial of wrist bracing at  night.  Patient's right thumb pain consistent with de Quervain's tenosynovitis.  Placed patient in a right thumb spica splint and again discussed NSAID use.  Work restrictions per D39.  Follow-up in clinic on 6/9/25 with occupational medicine.    Have you advised the patient to remain off work five days or more?   No  [] Yes  If yes, indicate dates: From_ _                                                      To __ _  [] No   If no, is the injured employee capable of: [] full duty No   [] modified duty Yes    If modified duty, specify any limitations / restrictions:__________________  ___See D-39___________________________     X-Ray Findings:   Comments:NA    From information given by the employee, together with medical evidence, can you directly connect this injury or occupational disease as job incurred?  []Yes   [] No Yes    Is additional medical care by a physician indicated? []Yes [] No  Yes    Do you know of any previous injury or disease contributing to this condition or occupational disease? []Yes [] No (Explain if yes)                          No   Date  6/2/2025 Print Health Care Provider’s Name  Fer Augustine P.A.-C. I certify that the employer’s copy of  this form was delivered to the employer on:   Address  [address] [place] [address] INSURER'S USE ONLY                       [facility]  [id number] Provider’s Tax ID Number  [id number]   Telephone  Dept: [phone]    Health Care Provider’s Original or Electronic Signature      malia-FER Daley P.A.-C.    Degree (MD,DO, DC,PALadonnaC,APRN)  ENMA  Choose (if applicable)      ORIGINAL - TREATING HEALTHCARE PROVIDER PAGE 2 - INSURER/TPA PAGE 3 - EMPLOYER PAGE 4 - EMPLOYEE             Form C-4 (rev.02/25)

## 2025-06-02 NOTE — LETTER
PHYSICIAN’S AND CHIROPRACTIC PHYSICIAN'S   PROGRESS REPORT   CERTIFICATION OF DISABILITY Claim Number:     Social Security Number:    Patient’s Name: Christiane Jones Date of Injury:  05/27/25   Employer: [employer] Name of MCO (if applicable):      Patient’s Job Description/Occupation:   Material Associate       Previous Injuries/Diseases/Surgeries Contributing to the Condition:  Patient has dealt with occasional tingling and paresthesias to the bilateral hands and digits over the last month.      Diagnosis: (G56.03) Carpal tunnel syndrome on both sides  (primary encounter diagnosis)  (M65.4) De Quervain's tenosynovitis, right      Related to the Industrial Injury? Yes     Explain: HPI:  DOI: 5/26/2025  FABIENNE:  Very pleasant 39-year-old female presents to the clinic after sustaining a work-related injury.  Patient works at [employer].  States she she has been experiencing numbness and tingling to both hands over the last month approximately.  The symptoms have gradually been worsening.  Believes they are secondary to overuse as she repetitively uses her hands throughout the day at work.  She does describe waking up in the morning with both hands numb and tingling.  Describes numbness and tingling over the bilateral hands 1st through 3rd digits.  Last week while at work she was trying to cut through a cardboard box when she felt an immediate sharp pain to the right thumb.  Continues to experience pain over the dorsal aspect of the thumb that extends towards the radial aspect of the forearm.  Has not noted any swelling or discoloration.  No OTC medications have been started.  Denies any prior injury or trauma.  She is right-hand dominant.      Objective Medical Findings: Constitutional: Pt is oriented to person, place, and time.  Appears well-developed and well-nourished. No distress.   Cardiovascular: Normal rate.    Pulmonary/Chest: Effort normal.     Musculoskeletal:   Right hand: No obvious deformity,  discoloration or edema present.  Patient maintains full wrist range of motion.   strength 5/5.  She does endorse some tenderness over the first digit proximal phalanx and first MCP joint.  Positive Finkelstein's.  Positive Phalen's.  Left hand: No obvious deformity, discoloration or edema.  Full wrist range of motion.   strength 5/5.  Positive Phalen's.    Neurological: Pt is alert and oriented to person, place, and time. Coordination normal.   Skin: Skin is warm. Pt is not diaphoretic. No erythema.   Psychiatric: Pt has a normal mood and affect.  Behavior is normal.            None - Discharged                         Stable  No                 Ratable  No        Generally Improved                         Condition Worsened               X   Condition Same  May Have Suffered a Permanent Disability No     Treatment Plan:    Patient's ongoing bilateral hand numbness and tingling consistent with carpal tunnel syndrome.  Discussed a trial of anti-inflammatories to take with food.  We also discussed a trial of wrist bracing at night.  Patient's right thumb pain consistent with de Quervain's tenosynovitis.  Placed patient in a right thumb spica splint and again discussed NSAID use.  Work restrictions per D39.  Follow-up in clinic on 6/9/25 with occupational medicine.         No Change in Therapy                  PT/OT Prescribed                      Medication May be Used While Working        Case Management                          PT/OT Discontinued    Consultation    Further Diagnostic Studies:    Prescription(s) Referral to occupational medicine.               Released to FULL DUTY /No Restrictions on (Date):       Certified TOTALLY TEMPORARILY DISABLED (Indicate Dates) From:   To:    X  Released to RESTRICTED/Modified Duty on (Date): From: 6/2/2025 To: 6/9/2025  Restrictions Are:         No Sitting    No Standing    No Pulling Other: Please allow patient to wear right thumb spica splint at all times while at  work.  No use of right hand at work.       No Bending at Waist     No Stooping     No Lifting        No Carrying     No Walking Lifting Restricted to (lbs.):          No Pushing        No Climbing     No Reaching Above Shoulders       Date of Next Visit:  6/9/2025 Date of this Exam: 6/2/2025 Physician/Chiropractic Physician Name: Toño Augustine P.A.-C. Physician/Chiropractic Physician Signature:  Federico Garcia DO MPH     [place]:  [address] [place] [address], [address] [place], [place] [address] - Telephone [phone] [place]:  [address] [place] [address], [address] [place], [place] [address] - Telephone [phone]    [email or web address]  D-39 (Rev. 10/24)

## 2025-06-02 NOTE — PROGRESS NOTES
Subjective:     CHIEF COMPLAINT  Chief Complaint   Patient presents with    Other     Workers comp, Right and left hand pain, DOI 5/26/2025.       HPI:  DOI: 5/26/2025  FABIENNE:  Very pleasant 39-year-old female presents to the clinic after sustaining a work-related injury.  Patient works at [employer].  States she she has been experiencing numbness and tingling to both hands over the last month approximately.  The symptoms have gradually been worsening.  Believes they are secondary to overuse as she repetitively uses her hands throughout the day at work.  She does describe waking up in the morning with both hands numb and tingling.  Describes numbness and tingling over the bilateral hands 1st through 3rd digits.  Last week while at work she was trying to cut through a cardboard box when she felt an immediate sharp pain to the right thumb.  Continues to experience pain over the dorsal aspect of the thumb that extends towards the radial aspect of the forearm.  Has not noted any swelling or discoloration.  No OTC medications have been started.  Denies any prior injury or trauma.  She is right-hand dominant.    REVIEW OF SYSTEMS  Review of Systems   Musculoskeletal:  Positive for joint pain and myalgias.   Neurological:  Positive for tingling.       PAST MEDICAL HISTORY  There are no active problems to display for this patient.      SURGICAL HISTORY   has a past surgical history that includes jorge by laparoscopy (9/14/2013).    ALLERGIES  Allergies[1]    CURRENT MEDICATIONS  Home Medications       Reviewed by Toño Augustine P.A.-C. (Physician Assistant) on 06/02/25 at 1340  Med List Status: <None>     Medication Last Dose Status   loperamide (IMODIUM) 2 MG Cap Not Taking Active   ondansetron (ZOFRAN ODT) 4 MG TABLET DISPERSIBLE Not Taking Active                    SOCIAL HISTORY  Social History     Tobacco Use    Smoking status: Never    Smokeless tobacco: Never   Substance and Sexual Activity    Alcohol use: Yes     Comment:  "occasional    Drug use: Yes     Types: Inhaled     Comment: occasional marijuana    Sexual activity: Yes     Partners: Male     Birth control/protection: Condom       FAMILY HISTORY  Family History   Problem Relation Age of Onset    Diabetes Father         pills    Arthritis Mother     Diabetes Mother         pills    Arthritis Maternal Aunt     Cancer Maternal Grandmother     Diabetes Maternal Grandfather     Diabetes Maternal Aunt           Objective:     VITAL SIGNS: /80   Pulse 61   Temp 37 °C (98.6 °F) (Temporal)   Resp 14   Ht 1.448 m (4' 9\")   Wt 63 kg (139 lb)   SpO2 100%   BMI 30.08 kg/m²     PHYSICAL EXAM  Physical Exam    Constitutional: Pt is oriented to person, place, and time.  Appears well-developed and well-nourished. No distress.   Cardiovascular: Normal rate.    Pulmonary/Chest: Effort normal.     Musculoskeletal:   Right hand: No obvious deformity, discoloration or edema present.  Patient maintains full wrist range of motion.   strength 5/5.  She does endorse some tenderness over the first digit proximal phalanx and first MCP joint.  Positive Finkelstein's.  Positive Phalen's.  Left hand: No obvious deformity, discoloration or edema.  Full wrist range of motion.   strength 5/5.  Positive Phalen's.    Neurological: Pt is alert and oriented to person, place, and time. Coordination normal.   Skin: Skin is warm. Pt is not diaphoretic. No erythema.   Psychiatric: Pt has a normal mood and affect.  Behavior is normal.     Assessment/Plan:     1. Carpal tunnel syndrome on both sides  - Referral to Occupational Medicine    2. De Quervain's tenosynovitis, right  - Referral to Occupational Medicine      MDM/Comments:    Patient's ongoing bilateral hand numbness and tingling consistent with carpal tunnel syndrome.  Discussed a trial of anti-inflammatories to take with food.  We also discussed a trial of wrist bracing at night.  Patient's right thumb pain consistent with de Quervain's " tenosynovitis.  Placed patient in a right thumb spica splint and again discussed NSAID use.  Work restrictions per D39.  Follow-up in clinic on 6/9/25 with occupational medicine.    Differential diagnosis, natural history, supportive care, and indications for immediate follow-up discussed. All questions answered. Patient agrees with the plan of care.    Follow-up as needed if symptoms worsen or fail to improve to PCP, Urgent care or Emergency Room.    I have personally reviewed prior external notes and test results pertinent to today's visit.  I have independently reviewed and interpreted all diagnostics ordered during this urgent care acute visit.   Discussed management options (risks,benefits, and alternatives to treatment). Pt expresses understanding and the treatment plan was agreed upon. Questions were encouraged and answered to pt's satisfaction.    Please note that this dictation was created using voice recognition software. I have made a reasonable attempt to correct obvious errors, but I expect that there are errors of grammar and possibly content that I did not discover before finalizing the note.       [1] No Known Allergies

## 2025-06-10 ENCOUNTER — APPOINTMENT (OUTPATIENT)
Dept: RADIOLOGY | Facility: IMAGING CENTER | Age: 40
End: 2025-06-10
Attending: NURSE PRACTITIONER
Payer: COMMERCIAL

## 2025-06-10 ENCOUNTER — OCCUPATIONAL MEDICINE (OUTPATIENT)
Dept: OCCUPATIONAL MEDICINE | Facility: CLINIC | Age: 40
End: 2025-06-10
Payer: COMMERCIAL

## 2025-06-10 DIAGNOSIS — G56.03 BILATERAL CARPAL TUNNEL SYNDROME: ICD-10-CM

## 2025-06-10 DIAGNOSIS — M65.4 DE QUERVAIN'S TENOSYNOVITIS, RIGHT: ICD-10-CM

## 2025-06-10 DIAGNOSIS — G56.03 BILATERAL CARPAL TUNNEL SYNDROME: Primary | ICD-10-CM

## 2025-06-10 PROCEDURE — 73130 X-RAY EXAM OF HAND: CPT | Mod: TC,LT | Performed by: RADIOLOGY

## 2025-06-10 PROCEDURE — 99213 OFFICE O/P EST LOW 20 MIN: CPT | Performed by: NURSE PRACTITIONER

## 2025-06-10 PROCEDURE — 73130 X-RAY EXAM OF HAND: CPT | Mod: TC,RT | Performed by: RADIOLOGY

## 2025-06-10 NOTE — PROGRESS NOTES
Subjective:     Christiane Jones is a 39 y.o. female who presents for Follow-Up    DOI: 5/26/2025. FABIENNE:  Patient works at [employer].  States she she has been experiencing numbness and tingling to both hands over the last month approximately.  The symptoms have gradually been worsening.  Believes they are secondary to overuse as she repetitively uses her hands throughout the day at work.        Today patient reports no improvement of symptoms, feels they are getting worse.  She states she has been depressed and as a result she has not returned to work even though she states her employer is able to accommodate her light duty restrictions.  She reports that at night she gets worsening burning sensation in both hands.  She is concerned about a lump that she has on her left thumb that feels like a loose bone.  She is waking up in the morning with both hands numb and tingling.  Describes numbness and tingling over the bilateral hands 1st through 3rd digits.  Continues to experience pain over the dorsal aspect of the thumb that extends towards the radial aspect of the forearm.  Has not noted any swelling or discoloration.  She takes OTC Tylenol if needed, tries to massage the area, elevate, and uses topical ointment but it only touches the pain for approximately a minute and then symptoms returned in severity.  X-ray results reviewed with patient for both hands and show no acute or bony deformities.  She has not returned to work on light duty because she is afraid to.  Hand surgery, hand therapy, and EMG/nerve conduction study requested at this time.  Plan of care discussed with patient.  Patient is very tearful during this visit.         ROS: All systems were reviewed on intake form, form was reviewed and signed. See scanned documents in media. Pertinent positives and negatives included in HPI.    PMH: No pertinent past medical history to this problem  MEDS: Medications were reviewed in Epic  ALLERGIES:  Allergies[1]  SOCHX: Works as a  at [employer] for the past 6 months  FH: No pertinent family history to this problem       Objective:     There were no vitals taken for this visit.    [unfilled]    Right hand: No obvious deformity, discoloration or edema present.  Patient maintains full wrist range of motion.   strength 4/5.  She does endorse some tenderness over the first digit proximal phalanx and first MCP joint.  Positive Finkelstein's.  Positive Phalen's.Left hand: No obvious deformity, discoloration or edema.  There is a bony lump noted in the webbing between the thumb and the pointer finger.  Full wrist range of motion.   strength 4/5.  Positive Phalen's.  Patient is tearful with exam during entire visit, secondary to pain.      Assessment/Plan:       1. Bilateral carpal tunnel syndrome  - DX-HAND 3+ LEFT; Future  - DX-HAND 3+ LEFT; Future  - Referral to Hand Surgery  - Referral to Physical Therapy  - Referral to Radiology    2. De Quervain's tenosynovitis, right  - Referral to Hand Surgery  - Referral to Physical Therapy  - Referral to Radiology      Please allow patient to wear right thumb splint while at work, no use of right hand at work  No pushing, pulling, or carrying anything more than 10 pounds with the left hand       Differential diagnosis, natural history, supportive care, and indications for immediate follow-up discussed.    Approximately 30 minutes were spent in reviewing notes, preparing for visit, obtaining history, exam and evaluation, patient counseling/education and post visit documentation/orders.         [1] No Known Allergies

## 2025-06-10 NOTE — LETTER
PHYSICIAN’S AND CHIROPRACTIC PHYSICIAN'S   PROGRESS REPORT   CERTIFICATION OF DISABILITY Claim Number:     Social Security Number:    Patient’s Name: Christiane Jones Date of Injury: 5/27/2025   Employer: [employer] Name of MCO (if applicable):      Patient’s Job Description/Occupation:        Previous Injuries/Diseases/Surgeries Contributing to the Condition:         Diagnosis: (G56.03) Bilateral carpal tunnel syndrome  (primary encounter diagnosis)  (M65.4) De Quervain's tenosynovitis, right      Related to the Industrial Injury? Yes     Explain: DOI: 5/26/2025. FABIENNE:  Patient works at [employer].  States she she has been experiencing numbness and tingling to both hands over the last month approximately.  The symptoms have gradually been worsening.  Believes they are secondary to overuse as she repetitively uses her hands throughout the day at work.       Objective Medical Findings: Right hand: No obvious deformity, discoloration or edema present.  Patient maintains full wrist range of motion.   strength 4/5.  She does endorse some tenderness over the first digit proximal phalanx and first MCP joint.  Positive Finkelstein's.  Positive Phalen's.Left hand: No obvious deformity, discoloration or edema.  There is a bony lump noted in the webbing between the thumb and the pointer finger.  Full wrist range of motion.   strength 4/5.  Positive Phalen's.  Patient is tearful with exam during entire visit, secondary to pain.           None - Discharged                         Stable  Yes                 Ratable  No        Generally Improved                      X   Condition Worsened               X   Condition Same  May Have Suffered a Permanent Disability No     Treatment Plan:    Follow-up in 4 weeks, unless seen by hand surgery  Recommend OTC Tylenol/ibuprofen, wrist braces while at work and at night, gentle range of motion stretching exercises as demonstrated  May return to clinic sooner with  new or worsening symptoms         No Change in Therapy               X   PT/OT Prescribed                      Medication May be Used While Working        Case Management                          PT/OT Discontinued  X  Consultation    Further Diagnostic Studies:    Prescription(s) Hand therapy requested  Hand surgery referral placed, transfer care    EMG/nerve conduction study requested  X-ray obtained of the right and left hands: There is normal bony mineralization.  There is no evidence of fracture, dislocation, or osseous lesion.  There is no evidence of soft tissue injury.   No radiographic evidence of acute injury.            Released to FULL DUTY /No Restrictions on (Date):       Certified TOTALLY TEMPORARILY DISABLED (Indicate Dates) From:   To:    X  Released to RESTRICTED/Modified Duty on (Date): From: 6/10/2025 To: 7/8/2025   Restrictions Are:  Temporary      No Sitting    No Standing    No Pulling Other: Please allow patient to wear right thumb splint while at work, no use of right hand at work  No pushing, pulling, or carrying anything more than 10 pounds with the left hand       No Bending at Waist     No Stooping     No Lifting        No Carrying     No Walking Lifting Restricted to (lbs.):          No Pushing        No Climbing     No Reaching Above Shoulders       Date of Next Visit:  Tuesday 7/8/2025  @ 7:30 AM  *Cancel if seen by hand surgery*  Date of this Exam: 6/10/2025 Physician/Chiropractic Physician Name: GINGER Tripp Physician/Chiropractic Physician Signature:  Federico Garcia DO [facility]:  [address] [place] [address], [address] [place], [place] [address] - Telephone [phone] [place]:  [address] [place] [address], [address] [place], [place] [address] - Telephone [phone]    [email or web address]  D-39 (Rev. 10/24)

## 2025-07-08 ENCOUNTER — OCCUPATIONAL MEDICINE (OUTPATIENT)
Dept: OCCUPATIONAL MEDICINE | Facility: CLINIC | Age: 40
End: 2025-07-08
Payer: COMMERCIAL

## 2025-07-08 VITALS
HEIGHT: 57 IN | SYSTOLIC BLOOD PRESSURE: 116 MMHG | WEIGHT: 138.89 LBS | OXYGEN SATURATION: 99 % | RESPIRATION RATE: 15 BRPM | HEART RATE: 62 BPM | TEMPERATURE: 98.6 F | BODY MASS INDEX: 29.96 KG/M2 | DIASTOLIC BLOOD PRESSURE: 80 MMHG

## 2025-07-08 DIAGNOSIS — M65.4 DE QUERVAIN'S TENOSYNOVITIS, RIGHT: ICD-10-CM

## 2025-07-08 DIAGNOSIS — G56.03 BILATERAL CARPAL TUNNEL SYNDROME: Primary | ICD-10-CM

## 2025-07-08 PROCEDURE — 1125F AMNT PAIN NOTED PAIN PRSNT: CPT | Performed by: NURSE PRACTITIONER

## 2025-07-08 PROCEDURE — 3074F SYST BP LT 130 MM HG: CPT | Performed by: NURSE PRACTITIONER

## 2025-07-08 PROCEDURE — 3079F DIAST BP 80-89 MM HG: CPT | Performed by: NURSE PRACTITIONER

## 2025-07-08 PROCEDURE — 99213 OFFICE O/P EST LOW 20 MIN: CPT | Performed by: NURSE PRACTITIONER

## 2025-07-08 ASSESSMENT — ENCOUNTER SYMPTOMS
ROS SKIN COMMENTS: SWELLING
CARDIOVASCULAR NEGATIVE: 1
CONSTITUTIONAL NEGATIVE: 1
SENSORY CHANGE: 1
TINGLING: 1
PSYCHIATRIC NEGATIVE: 1
RESPIRATORY NEGATIVE: 1
MYALGIAS: 1
WEAKNESS: 1

## 2025-07-08 ASSESSMENT — PAIN SCALES - GENERAL: PAINLEVEL_OUTOF10: 4=SLIGHT-MODERATE PAIN

## 2025-07-08 ASSESSMENT — FIBROSIS 4 INDEX: FIB4 SCORE: 0.75

## 2025-07-08 NOTE — PROGRESS NOTES
Subjective:     Christiane Jones is a 39 y.o. female who presents for Follow-Up (DOI: 5/27/25 right hand, wrist, thumb )    DOI: 5/26/2025. FABIENNE:  Patient works at [employer].  States she she has been experiencing numbness and tingling to both hands over the last month approximately.  The symptoms have gradually been worsening.  Believes they are secondary to overuse as she repetitively uses her hands throughout the day at work.          Today patient reports minimally improvement of symptoms. She reports that at night she gets worsening burning sensation in both hands. Continued concerns about a lump that she has on her left thumb that feels like a loose bone.  She is waking up in the morning with both hands numb and tingling.  Describes numbness and tingling over the bilateral hands 1st through 3rd digits.  Continues to experience pain over the dorsal aspect of the thumb that extends towards the radial aspect of the forearm.  Has not noted any swelling or discoloration.  She takes OTC Tylenol if needed, tries to massage the area, elevate, and uses topical ointment but it only touches the pain for approximately a minute and then symptoms returned in severity.  X-ray results reviewed with patient for both hands and show no acute or bony deformities.  She has not returned to work on light duty because she is afraid to.  Hand surgery and hand therapy are approved pending scheduling. Her EMG/nerve conduction study is 11/26/25.  Plan of care discussed with patient.      Review of Systems   Constitutional: Negative.    Respiratory: Negative.     Cardiovascular: Negative.    Musculoskeletal:  Positive for joint pain and myalgias.   Skin:         Swelling    Neurological:  Positive for tingling, sensory change and weakness.   Psychiatric/Behavioral: Negative.         SOCHX: Works as a  at [employer] for the past 6 months  FH: No pertinent family history to this problem       Objective:     /80   Pulse  "62   Temp 37 °C (98.6 °F)   Resp 15   Ht 1.448 m (4' 9.01\")   Wt 63 kg (138 lb 14.2 oz)   SpO2 99%   BMI 30.05 kg/m²     Constitutional: Patient is in no acute distress. Appears well-developed and well-nourished.   Cardiovascular: Normal rate.    Pulmonary/Chest: Effort normal. No respiratory distress.   Neurological: Patient is alert and oriented to person, place, and time.   Skin: Skin is warm and dry.   Psychiatric: Normal mood and affect. Behavior is normal.     Right hand: No obvious deformity, discoloration or edema present.  Patient maintains full wrist range of motion.   strength 4/5.  She does endorse some tenderness over the first digit proximal phalanx and first MCP joint.  Positive Finkelstein's.  Positive Phalen's.Left hand: No obvious deformity, discoloration or edema.  There is a bony lump noted in the webbing between the thumb and the pointer finger.  Full wrist range of motion.   strength 4/5.  Positive Phalen's.     Assessment/Plan:       1. Bilateral carpal tunnel syndrome    2. De Quervain's tenosynovitis, right     Follow-up in 4 weeks, unless seen by hand surgery  Recommend OTC Tylenol/ibuprofen, wrist braces while at work and at night, gentle range of motion stretching exercises as demonstrated  May return to clinic sooner with new or worsening symptoms            Differential diagnosis, natural history, supportive care, and indications for immediate follow-up discussed.    Approximately 25 minutes was spent in preparing for visit, obtaining history, exam and evaluation, patient counseling/education and post visit documentation/orders.    "

## 2025-07-08 NOTE — LETTER
PHYSICIAN’S AND CHIROPRACTIC PHYSICIAN'S   PROGRESS REPORT   CERTIFICATION OF DISABILITY Claim Number:     Social Security Number:    Patient’s Name: Christiane Jones Date of Injury: 5/27/2025   Employer: [employer] Name of MCO (if applicable):      Patient’s Job Description/Occupation:        Previous Injuries/Diseases/Surgeries Contributing to the Condition:         Diagnosis: (G56.03) Bilateral carpal tunnel syndrome  (primary encounter diagnosis)  (M65.4) De Quervain's tenosynovitis, right      Related to the Industrial Injury? Yes     Explain: DOI: 5/26/2025. FABIENNE:  Patient works at [employer].  States she she has been experiencing numbness and tingling to both hands over the last month approximately.  The symptoms have gradually been worsening.  Believes they are secondary to overuse as she repetitively uses her hands throughout the day at work.        Objective Medical Findings: Right hand: No obvious deformity, discoloration or edema present.  Patient maintains full wrist range of motion.   strength 4/5.  She does endorse some tenderness over the first digit proximal phalanx and first MCP joint.  Positive Finkelstein's.  Positive Phalen's.Left hand: No obvious deformity, discoloration or edema.  There is a bony lump noted in the webbing between the thumb and the pointer finger.  Full wrist range of motion.   strength 4/5.  Positive Phalen's.          None - Discharged                         Stable  Yes                 Ratable  No        Generally Improved                         Condition Worsened               X   Condition Same  May Have Suffered a Permanent Disability No     Treatment Plan:     Follow-up in 4 weeks, unless seen by hand surgery  Recommend OTC Tylenol/ibuprofen, wrist braces while at work and at night, gentle range of motion stretching exercises as demonstrated  May return to clinic sooner with new or worsening symptoms           No Change in Therapy               X    PT/OT Prescribed                      Medication May be Used While Working        Case Management                          PT/OT Discontinued  X  Consultation    Further Diagnostic Studies:    Prescription(s) Physical therapy appointments as scheduled  Hand surgery referral approved, pending scheduling transfer care    EMG pending scheduling          Released to FULL DUTY /No Restrictions on (Date):       Certified TOTALLY TEMPORARILY DISABLED (Indicate Dates) From:   To:    X  Released to RESTRICTED/Modified Duty on (Date): From: 7/8/2025 To:  8/05/2025  Restrictions Are:  Temporary      No Sitting    No Standing    No Pulling Other: Please allow patient to wear right thumb splint while at work, no use of right hand at work  No pushing, pulling, or carrying anything more than 10 pounds with the left hand         No Bending at Waist     No Stooping     No Lifting        No Carrying     No Walking Lifting Restricted to (lbs.):  < or = to 10 pounds       No Pushing        No Climbing     No Reaching Above Shoulders       Date of Next Visit:   8/05/2025 AT 7:30 AM Date of this Exam: 7/8/2025 Physician/Chiropractic Physician Name: GINGER Tripp Physician/Chiropractic Physician Signature:  Federico Garcia DO MPH     [place]:  [address] [place] [address], [address] [place], [place] [address] - Telephone [phone] [place]:  [address] [place] [address], [address] [place], [place] [address] - Telephone [phone]    [email or web address]  D-39 (Rev. 10/24)

## 2025-08-01 ENCOUNTER — TELEPHONE (OUTPATIENT)
Dept: OCCUPATIONAL MEDICINE | Facility: CLINIC | Age: 40
End: 2025-08-01
Payer: COMMERCIAL

## 2025-08-04 ENCOUNTER — TELEPHONE (OUTPATIENT)
Dept: OCCUPATIONAL MEDICINE | Facility: CLINIC | Age: 40
End: 2025-08-04
Payer: COMMERCIAL

## 2025-08-05 ENCOUNTER — OCCUPATIONAL MEDICINE (OUTPATIENT)
Dept: OCCUPATIONAL MEDICINE | Facility: CLINIC | Age: 40
End: 2025-08-05
Payer: COMMERCIAL

## 2025-08-05 VITALS
WEIGHT: 147 LBS | SYSTOLIC BLOOD PRESSURE: 126 MMHG | BODY MASS INDEX: 31.71 KG/M2 | HEART RATE: 64 BPM | DIASTOLIC BLOOD PRESSURE: 82 MMHG | TEMPERATURE: 96.9 F | HEIGHT: 57 IN | OXYGEN SATURATION: 98 % | RESPIRATION RATE: 14 BRPM

## 2025-08-05 DIAGNOSIS — M65.4 DE QUERVAIN'S TENOSYNOVITIS, RIGHT: ICD-10-CM

## 2025-08-05 DIAGNOSIS — M65.311 TRIGGER FINGER OF RIGHT THUMB: ICD-10-CM

## 2025-08-05 DIAGNOSIS — G56.03 BILATERAL CARPAL TUNNEL SYNDROME: Primary | ICD-10-CM

## 2025-08-05 PROCEDURE — 99213 OFFICE O/P EST LOW 20 MIN: CPT | Performed by: NURSE PRACTITIONER

## 2025-08-05 PROCEDURE — 3074F SYST BP LT 130 MM HG: CPT | Performed by: NURSE PRACTITIONER

## 2025-08-05 PROCEDURE — 3079F DIAST BP 80-89 MM HG: CPT | Performed by: NURSE PRACTITIONER

## 2025-08-05 ASSESSMENT — ENCOUNTER SYMPTOMS
WEAKNESS: 1
TINGLING: 1
RESPIRATORY NEGATIVE: 1
MYALGIAS: 1
CONSTITUTIONAL NEGATIVE: 1
CARDIOVASCULAR NEGATIVE: 1
SENSORY CHANGE: 1
PSYCHIATRIC NEGATIVE: 1

## 2025-08-05 ASSESSMENT — FIBROSIS 4 INDEX: FIB4 SCORE: 0.75
